# Patient Record
Sex: FEMALE | Race: WHITE | HISPANIC OR LATINO | Employment: UNEMPLOYED | URBAN - NONMETROPOLITAN AREA
[De-identification: names, ages, dates, MRNs, and addresses within clinical notes are randomized per-mention and may not be internally consistent; named-entity substitution may affect disease eponyms.]

---

## 2022-09-11 ENCOUNTER — APPOINTMENT (OUTPATIENT)
Dept: RADIOLOGY | Facility: HOSPITAL | Age: 42
End: 2022-09-11
Payer: COMMERCIAL

## 2022-09-11 ENCOUNTER — HOSPITAL ENCOUNTER (EMERGENCY)
Facility: HOSPITAL | Age: 42
End: 2022-09-14
Attending: EMERGENCY MEDICINE
Payer: COMMERCIAL

## 2022-09-11 DIAGNOSIS — R07.9 CHEST PAIN: ICD-10-CM

## 2022-09-11 DIAGNOSIS — R45.851 SUICIDAL IDEATION: Primary | ICD-10-CM

## 2022-09-11 DIAGNOSIS — F41.9 ANXIETY: ICD-10-CM

## 2022-09-11 LAB
ALBUMIN SERPL BCP-MCNC: 3.4 G/DL (ref 3.5–5)
ALP SERPL-CCNC: 75 U/L (ref 46–116)
ALT SERPL W P-5'-P-CCNC: 19 U/L (ref 12–78)
AMPHETAMINES SERPL QL SCN: NEGATIVE
ANION GAP SERPL CALCULATED.3IONS-SCNC: 8 MMOL/L (ref 4–13)
APAP SERPL-MCNC: <2 UG/ML (ref 10–20)
AST SERPL W P-5'-P-CCNC: 26 U/L (ref 5–45)
BARBITURATES UR QL: NEGATIVE
BASOPHILS # BLD AUTO: 0.04 THOUSANDS/ΜL (ref 0–0.1)
BASOPHILS NFR BLD AUTO: 0 % (ref 0–1)
BENZODIAZ UR QL: NEGATIVE
BILIRUB SERPL-MCNC: 0.75 MG/DL (ref 0.2–1)
BUN SERPL-MCNC: 18 MG/DL (ref 5–25)
CALCIUM ALBUM COR SERPL-MCNC: 9.1 MG/DL (ref 8.3–10.1)
CALCIUM SERPL-MCNC: 8.6 MG/DL (ref 8.3–10.1)
CARDIAC TROPONIN I PNL SERPL HS: 3 NG/L
CHLORIDE SERPL-SCNC: 103 MMOL/L (ref 96–108)
CO2 SERPL-SCNC: 26 MMOL/L (ref 21–32)
COCAINE UR QL: NEGATIVE
CREAT SERPL-MCNC: 0.86 MG/DL (ref 0.6–1.3)
EOSINOPHIL # BLD AUTO: 0.34 THOUSAND/ΜL (ref 0–0.61)
EOSINOPHIL NFR BLD AUTO: 3 % (ref 0–6)
ERYTHROCYTE [DISTWIDTH] IN BLOOD BY AUTOMATED COUNT: 14.3 % (ref 11.6–15.1)
ETHANOL SERPL-MCNC: <3 MG/DL (ref 0–3)
EXT PREG TEST URINE: NEGATIVE
EXT. CONTROL ED NAV: NORMAL
GFR SERPL CREATININE-BSD FRML MDRD: 85 ML/MIN/1.73SQ M
GLUCOSE SERPL-MCNC: 93 MG/DL (ref 65–140)
HCT VFR BLD AUTO: 42.3 % (ref 34.8–46.1)
HGB BLD-MCNC: 13.5 G/DL (ref 11.5–15.4)
IMM GRANULOCYTES # BLD AUTO: 0.06 THOUSAND/UL (ref 0–0.2)
IMM GRANULOCYTES NFR BLD AUTO: 1 % (ref 0–2)
INR PPP: 0.9 (ref 0.84–1.19)
LYMPHOCYTES # BLD AUTO: 3.9 THOUSANDS/ΜL (ref 0.6–4.47)
LYMPHOCYTES NFR BLD AUTO: 30 % (ref 14–44)
MCH RBC QN AUTO: 28.4 PG (ref 26.8–34.3)
MCHC RBC AUTO-ENTMCNC: 31.9 G/DL (ref 31.4–37.4)
MCV RBC AUTO: 89 FL (ref 82–98)
METHADONE UR QL: NEGATIVE
MONOCYTES # BLD AUTO: 1.01 THOUSAND/ΜL (ref 0.17–1.22)
MONOCYTES NFR BLD AUTO: 8 % (ref 4–12)
NEUTROPHILS # BLD AUTO: 7.61 THOUSANDS/ΜL (ref 1.85–7.62)
NEUTS SEG NFR BLD AUTO: 58 % (ref 43–75)
NRBC BLD AUTO-RTO: 0 /100 WBCS
OPIATES UR QL SCN: NEGATIVE
OXYCODONE+OXYMORPHONE UR QL SCN: NEGATIVE
PCP UR QL: NEGATIVE
PLATELET # BLD AUTO: 322 THOUSANDS/UL (ref 149–390)
PMV BLD AUTO: 8.3 FL (ref 8.9–12.7)
POTASSIUM SERPL-SCNC: 4.1 MMOL/L (ref 3.5–5.3)
PROT SERPL-MCNC: 7 G/DL (ref 6.4–8.4)
PROTHROMBIN TIME: 12.3 SECONDS (ref 11.6–14.5)
RBC # BLD AUTO: 4.76 MILLION/UL (ref 3.81–5.12)
SALICYLATES SERPL-MCNC: 3 MG/DL (ref 3–20)
SODIUM SERPL-SCNC: 137 MMOL/L (ref 135–147)
THC UR QL: POSITIVE
WBC # BLD AUTO: 12.96 THOUSAND/UL (ref 4.31–10.16)

## 2022-09-11 PROCEDURE — 80053 COMPREHEN METABOLIC PANEL: CPT | Performed by: EMERGENCY MEDICINE

## 2022-09-11 PROCEDURE — 82077 ASSAY SPEC XCP UR&BREATH IA: CPT | Performed by: EMERGENCY MEDICINE

## 2022-09-11 PROCEDURE — 99285 EMERGENCY DEPT VISIT HI MDM: CPT | Performed by: EMERGENCY MEDICINE

## 2022-09-11 PROCEDURE — 71045 X-RAY EXAM CHEST 1 VIEW: CPT

## 2022-09-11 PROCEDURE — 99285 EMERGENCY DEPT VISIT HI MDM: CPT

## 2022-09-11 PROCEDURE — 84484 ASSAY OF TROPONIN QUANT: CPT | Performed by: EMERGENCY MEDICINE

## 2022-09-11 PROCEDURE — 85610 PROTHROMBIN TIME: CPT | Performed by: EMERGENCY MEDICINE

## 2022-09-11 PROCEDURE — 80143 DRUG ASSAY ACETAMINOPHEN: CPT | Performed by: EMERGENCY MEDICINE

## 2022-09-11 PROCEDURE — 81025 URINE PREGNANCY TEST: CPT | Performed by: EMERGENCY MEDICINE

## 2022-09-11 PROCEDURE — 85025 COMPLETE CBC W/AUTO DIFF WBC: CPT | Performed by: EMERGENCY MEDICINE

## 2022-09-11 PROCEDURE — 99242 OFF/OP CONSLTJ NEW/EST SF 20: CPT | Performed by: GENERAL PRACTICE

## 2022-09-11 PROCEDURE — 80179 DRUG ASSAY SALICYLATE: CPT | Performed by: EMERGENCY MEDICINE

## 2022-09-11 PROCEDURE — 36415 COLL VENOUS BLD VENIPUNCTURE: CPT | Performed by: EMERGENCY MEDICINE

## 2022-09-11 PROCEDURE — 80307 DRUG TEST PRSMV CHEM ANLYZR: CPT | Performed by: EMERGENCY MEDICINE

## 2022-09-11 RX ORDER — LORAZEPAM 1 MG/1
1 TABLET ORAL ONCE
Status: COMPLETED | OUTPATIENT
Start: 2022-09-11 | End: 2022-09-11

## 2022-09-11 RX ORDER — ARIPIPRAZOLE 2 MG/1
2 TABLET ORAL DAILY
Status: DISCONTINUED | OUTPATIENT
Start: 2022-09-11 | End: 2022-09-14 | Stop reason: HOSPADM

## 2022-09-11 RX ORDER — CLONIDINE HYDROCHLORIDE 0.1 MG/1
0.1 TABLET ORAL
Status: DISCONTINUED | OUTPATIENT
Start: 2022-09-11 | End: 2022-09-14 | Stop reason: HOSPADM

## 2022-09-11 RX ORDER — ACETAMINOPHEN 325 MG/1
650 TABLET ORAL ONCE
Status: COMPLETED | OUTPATIENT
Start: 2022-09-11 | End: 2022-09-11

## 2022-09-11 RX ADMIN — LORAZEPAM 1 MG: 1 TABLET ORAL at 16:41

## 2022-09-11 RX ADMIN — ARIPIPRAZOLE 2 MG: 2 TABLET ORAL at 21:36

## 2022-09-11 RX ADMIN — CLONIDINE HYDROCHLORIDE 0.1 MG: 0.1 TABLET ORAL at 21:36

## 2022-09-11 RX ADMIN — ACETAMINOPHEN 650 MG: 325 TABLET ORAL at 15:33

## 2022-09-11 NOTE — ED NOTES
Patient changed into paper scrubs, room cleared for [facility] patient   Belongs checked and placed in locker [address] [place] [address], [address] [place] [address]  09/11/22 Yandel Sotelo RN  09/11/22 3381 [facility] Fleiz Chapin RN  09/11/22 5282
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
## 2022-09-12 PROCEDURE — 96372 THER/PROPH/DIAG INJ SC/IM: CPT

## 2022-09-12 RX ORDER — LORAZEPAM 2 MG/ML
1 INJECTION INTRAMUSCULAR ONCE
Status: COMPLETED | OUTPATIENT
Start: 2022-09-12 | End: 2022-09-12

## 2022-09-12 RX ORDER — LORAZEPAM 1 MG/1
1 TABLET ORAL EVERY 4 HOURS PRN
Status: DISCONTINUED | OUTPATIENT
Start: 2022-09-12 | End: 2022-09-14 | Stop reason: HOSPADM

## 2022-09-12 RX ADMIN — ARIPIPRAZOLE 2 MG: 2 TABLET ORAL at 09:38

## 2022-09-12 RX ADMIN — CLONIDINE HYDROCHLORIDE 0.1 MG: 0.1 TABLET ORAL at 21:02

## 2022-09-12 RX ADMIN — LORAZEPAM 1 MG: 2 INJECTION INTRAMUSCULAR; INTRAVENOUS at 13:20

## 2022-09-12 RX ADMIN — LORAZEPAM 1 MG: 1 TABLET ORAL at 20:43

## 2022-09-12 NOTE — ED NOTES
Patient provided with toothbrush and toothpaste, patient offers no complaints   1:1 observation still in progress     Erik Estevez RN  09/12/22 1015
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
## 2022-09-12 NOTE — ED NOTES
Pt asked that her boyfriend Darrell Nose not be let back in for the night  Pt was not comfortable with him in the room anymore  Boyfriend was told by registration and charge nurse was made aware       Mackenzie Flores RN  09/11/22 3509

## 2022-09-12 NOTE — ED NOTES
Bed search ongoing    [facility] - no beds per Ruthie Craven - no beds per Hernán Lyle - no beds per [place] [address] - no beds per [facility] - no beds per Zeke Moreno - no beds per [facility] - no beds per [facility] - left a message  [facility] - no beds per Mika Peres  [facility] - no beds per [facility] - no beds per [facility] - no beds per [facility]  - no beds per Ariana Coley

## 2022-09-12 NOTE — ED NOTES
Patients "georgie" Chang Gosling asking to come back, RN told font desk to tell him we are not allowing visitors at this time       Sandi Costello RN  09/12/22 6005

## 2022-09-12 NOTE — ED NOTES
[facility] and made aware of 36, Lawton Goldmann with Crisis stated he would be in within the next hour to complete 302 paperwork       Peggy Johns RN  09/12/22 4690

## 2022-09-12 NOTE — ED NOTES
It was brought to the nurses attention by Shaun Nunez from crisis that patient stated to him during the evaluation that she has been self cutting while in the room  Assessed the Patient and pt had no objects on her, however she was using her hospital ID band and found an ECG sticker that was missed on her and was using it to make superficial cuts into left wrist  ID band was removed and ECG sticker was thrown away  Small superficial cuts were noted on her left anterior wrist  MD was made aware  1:1 was re educated on proper visualization of the patient and their activity while in the room       Urszula Barrett RN  09/11/22 0086

## 2022-09-12 NOTE — ED CARE HANDOFF
Emergency Department Sign Out Note        Sign out and transfer of care from Curtis Sánchez  See Separate Emergency Department note  The patient, Linsey Morris, was evaluated by the previous provider for psychiatric evaluation  Workup Completed:  Patient was seen and evaluated by Psychiatry, recommend 201 and if unwilling 302  Patient has been medically cleared    ED Course / Workup Pending (followup): At approximately 12:30 p m  Today, patient became agitated requesting to leave the hospital   I explained to her that she requires inpatient psychiatric treatment and that if she refuses a [id number] petition will be initiated  The patient then eloped out of the emergency room and the police were called to bring the patient back  ED Course as of 09/12/22 1624   Mon Sep 12, 2022   1548 Signed out to Dr Sujatha Coates at [address] [place] [address],[address] pending inpatient psychiatric bed availability     [phone]   Pt now 302 2dary to earlier elopement     Procedures  MDM        Disposition  Final diagnoses:   Suicidal ideation   Anxiety   Chest pain     Time reflects when diagnosis was documented in both MDM as applicable and the Disposition within this note     Time User Action Codes Description Comment    9/11/2022  8:12 PM Miah Neri [facility] Suicidal ideation     9/11/2022  8:12 PM Salbador Rodriguez Add [F41 9] Anxiety     9/11/2022  8:12 PM Salbador Rodriguez Add [R07 9] Chest pain       ED Disposition     ED Disposition   Transfer to [address] [place] [address]   --    Date/Time   Sun Sep 11, 2022  8:32 PM    Comment              MD Elena Benítez Most Recent Value   Sending MD Dr Lex Jeffries    None       Patient's Medications    No medications on file     No discharge procedures on file         ED Provider  Electronically Signed by     Usman Jacome MD  09/12/22 2322

## 2022-09-12 NOTE — ED NOTES
Assumed care of patient  Patient sleeping with no signs of respiratory distress noted, 1:1 continued for patient        Dionne Kim RN  09/12/22 4424

## 2022-09-12 NOTE — ED NOTES
302 signed and Lawton Goldmann with [address] read patient rights        Peggy Johns, PATRICA  09/12/22 3537

## 2022-09-13 LAB
FLUAV RNA RESP QL NAA+PROBE: NEGATIVE
FLUBV RNA RESP QL NAA+PROBE: NEGATIVE
RSV RNA RESP QL NAA+PROBE: NEGATIVE
SARS-COV-2 RNA RESP QL NAA+PROBE: NEGATIVE

## 2022-09-13 PROCEDURE — 0241U HB NFCT DS VIR RESP RNA 4 TRGT: CPT | Performed by: EMERGENCY MEDICINE

## 2022-09-13 RX ORDER — ACETAMINOPHEN 325 MG/1
650 TABLET ORAL EVERY 4 HOURS PRN
Status: CANCELLED | OUTPATIENT
Start: 2022-09-13

## 2022-09-13 RX ORDER — HYDROXYZINE HYDROCHLORIDE 25 MG/1
25 TABLET, FILM COATED ORAL
Status: CANCELLED | OUTPATIENT
Start: 2022-09-13

## 2022-09-13 RX ORDER — LORAZEPAM 2 MG/ML
2 INJECTION INTRAMUSCULAR EVERY 6 HOURS PRN
Status: CANCELLED | OUTPATIENT
Start: 2022-09-13

## 2022-09-13 RX ORDER — BENZTROPINE MESYLATE 1 MG/1
1 TABLET ORAL
Status: CANCELLED | OUTPATIENT
Start: 2022-09-13

## 2022-09-13 RX ORDER — OLANZAPINE 10 MG/1
10 INJECTION, POWDER, LYOPHILIZED, FOR SOLUTION INTRAMUSCULAR
Status: CANCELLED | OUTPATIENT
Start: 2022-09-13

## 2022-09-13 RX ORDER — ACETAMINOPHEN 325 MG/1
650 TABLET ORAL EVERY 6 HOURS PRN
Status: CANCELLED | OUTPATIENT
Start: 2022-09-13

## 2022-09-13 RX ORDER — MAGNESIUM HYDROXIDE/ALUMINUM HYDROXICE/SIMETHICONE 120; 1200; 1200 MG/30ML; MG/30ML; MG/30ML
30 SUSPENSION ORAL EVERY 4 HOURS PRN
Status: CANCELLED | OUTPATIENT
Start: 2022-09-13

## 2022-09-13 RX ORDER — CLONIDINE HYDROCHLORIDE 0.1 MG/1
0.1 TABLET ORAL
Status: CANCELLED | OUTPATIENT
Start: 2022-09-13

## 2022-09-13 RX ORDER — ARIPIPRAZOLE 2 MG/1
2 TABLET ORAL DAILY
Status: CANCELLED | OUTPATIENT
Start: 2022-09-14

## 2022-09-13 RX ORDER — DIPHENHYDRAMINE HYDROCHLORIDE 50 MG/ML
50 INJECTION INTRAMUSCULAR; INTRAVENOUS EVERY 6 HOURS PRN
Status: CANCELLED | OUTPATIENT
Start: 2022-09-13

## 2022-09-13 RX ORDER — AMOXICILLIN 250 MG
1 CAPSULE ORAL DAILY PRN
Status: CANCELLED | OUTPATIENT
Start: 2022-09-13

## 2022-09-13 RX ORDER — HYDROXYZINE HYDROCHLORIDE 25 MG/1
100 TABLET, FILM COATED ORAL
Status: CANCELLED | OUTPATIENT
Start: 2022-09-13

## 2022-09-13 RX ORDER — HYDROXYZINE HYDROCHLORIDE 25 MG/1
50 TABLET, FILM COATED ORAL
Status: CANCELLED | OUTPATIENT
Start: 2022-09-13

## 2022-09-13 RX ORDER — OLANZAPINE 2.5 MG/1
5 TABLET ORAL
Status: CANCELLED | OUTPATIENT
Start: 2022-09-13

## 2022-09-13 RX ORDER — BENZTROPINE MESYLATE 1 MG/ML
1 INJECTION INTRAMUSCULAR; INTRAVENOUS
Status: CANCELLED | OUTPATIENT
Start: 2022-09-13

## 2022-09-13 RX ORDER — ACETAMINOPHEN 325 MG/1
650 TABLET ORAL ONCE
Status: COMPLETED | OUTPATIENT
Start: 2022-09-13 | End: 2022-09-13

## 2022-09-13 RX ORDER — OLANZAPINE 10 MG/1
5 INJECTION, POWDER, LYOPHILIZED, FOR SOLUTION INTRAMUSCULAR
Status: CANCELLED | OUTPATIENT
Start: 2022-09-13

## 2022-09-13 RX ORDER — TRAZODONE HYDROCHLORIDE 50 MG/1
50 TABLET ORAL
Status: CANCELLED | OUTPATIENT
Start: 2022-09-13

## 2022-09-13 RX ORDER — ACETAMINOPHEN 325 MG/1
975 TABLET ORAL EVERY 6 HOURS PRN
Status: CANCELLED | OUTPATIENT
Start: 2022-09-13

## 2022-09-13 RX ORDER — OLANZAPINE 10 MG/1
10 TABLET ORAL
Status: CANCELLED | OUTPATIENT
Start: 2022-09-13

## 2022-09-13 RX ORDER — LORAZEPAM 0.5 MG/1
0.5 TABLET ORAL EVERY 6 HOURS PRN
Status: CANCELLED | OUTPATIENT
Start: 2022-09-13

## 2022-09-13 RX ORDER — OLANZAPINE 2.5 MG/1
2.5 TABLET ORAL
Status: CANCELLED | OUTPATIENT
Start: 2022-09-13

## 2022-09-13 RX ORDER — POLYETHYLENE GLYCOL 3350 17 G/17G
17 POWDER, FOR SOLUTION ORAL DAILY PRN
Status: CANCELLED | OUTPATIENT
Start: 2022-09-13

## 2022-09-13 RX ADMIN — ACETAMINOPHEN 650 MG: 325 TABLET ORAL at 20:18

## 2022-09-13 RX ADMIN — LORAZEPAM 1 MG: 1 TABLET ORAL at 09:52

## 2022-09-13 RX ADMIN — CLONIDINE HYDROCHLORIDE 0.1 MG: 0.1 TABLET ORAL at 21:59

## 2022-09-13 RX ADMIN — ARIPIPRAZOLE 2 MG: 2 TABLET ORAL at 09:51

## 2022-09-13 RX ADMIN — LORAZEPAM 1 MG: 1 TABLET ORAL at 19:53

## 2022-09-13 RX ADMIN — LORAZEPAM 1 MG: 1 TABLET ORAL at 14:55

## 2022-09-13 NOTE — ED NOTES
Insurance Authorization for admission:   Phone call placed to Chelsea Berry  Phone number: [phone]  Spoke to [facility]      14 days approved  9/13/22- 9/26/22  Level of care: 201 behavioral health addmission  Review on 9/26/22  Authorization # [id number]

## 2022-09-13 NOTE — ED NOTES
Patient is accepted at [facility]   Patient is accepted by Sintia Vilchis      Patient may go to the floor after 2000         Nurse report is to be called to [phone] prior to patient transfer

## 2022-09-13 NOTE — ED NOTES
Assumed care of pt, patient is resting comfortably, 1:1 in place        Valerie Valenzuela RN  09/13/22 0718

## 2022-09-13 NOTE — ED NOTES
Pt reports she feels anxious and is having racing thoughts pt states "I feel like I want to leave", pt requested ativan  Pt provided with Ativan per MAR  Pt reports no medical complaints at this time        Juancarlos , RN  09/13/22 0482

## 2022-09-13 NOTE — ED NOTES
Insurance Authorization for admission:   Logged precert  Phone call placed to [employer] number:  [phone]  EVS (Eligibility Verification System) called - [phone]    Automated system indicates: eligible for MA in [employer] for Transportation:    Not needed for higher level of care

## 2022-09-13 NOTE — ED CARE HANDOFF
Emergency Department Sign Out Note        Sign out and transfer of care from Dr FIFI BROCK at 7:00 a m  Art Laws See Separate Emergency Department note  The patient, Kathrin Norman, was evaluated by the previous provider for psychiatric evaluation  Workup Completed:  Medically cleared    ED Course / Workup Pending (followup): Remains stable, awaiting inpatient bed assignment                                  ED Course as of 09/13/22 1554   Mon Sep 12, 2022   1546 Signed out to Dr Jama Powell at [address] [facility],[address] Floor pending inpatient psychiatric bed availability     [phone]   Pt now 302 2dary to earlier elopement   Tue Sep 13, 2022   1554 Signed out to Dr Andrea Spangler pending inpatient bed availability     Procedures  MDM        Disposition  Final diagnoses:   Suicidal ideation   Anxiety   Chest pain     Time reflects when diagnosis was documented in both MDM as applicable and the Disposition within this note     Time User Action Codes Description Comment    9/11/2022  8:12 PM Halle Miah Alvarez Elsa Suicidal ideation     9/11/2022  8:12 PM Les Rota Add [F41 9] Anxiety     9/11/2022  8:12 PM Les Rota Add [R07 9] Chest pain       ED Disposition     ED Disposition   Transfer to [address] [place] [address]   --    Date/Time   Sun Sep 11, 2022  8:32 PM    Comment              MD Documentation    Thomspike Loges   Sending MD Dr Fer Rivero    None       Patient's Medications    No medications on file     No discharge procedures on file         ED Provider  Electronically Signed by     Tamera Medley MD  09/13/22 0782

## 2022-09-14 ENCOUNTER — HOSPITAL ENCOUNTER (INPATIENT)
Facility: HOSPITAL | Age: 42
LOS: 13 days | Discharge: HOME | DRG: 753 | End: 2022-09-27
Attending: HOSPITALIST | Admitting: PSYCHIATRY & NEUROLOGY
Payer: COMMERCIAL

## 2022-09-14 VITALS
HEIGHT: 65 IN | TEMPERATURE: 98.8 F | WEIGHT: 185.41 LBS | BODY MASS INDEX: 30.89 KG/M2 | SYSTOLIC BLOOD PRESSURE: 111 MMHG | OXYGEN SATURATION: 98 % | HEART RATE: 75 BPM | DIASTOLIC BLOOD PRESSURE: 78 MMHG | RESPIRATION RATE: 16 BRPM

## 2022-09-14 DIAGNOSIS — R07.9 CHEST PAIN: ICD-10-CM

## 2022-09-14 DIAGNOSIS — J45.909 ASTHMA: ICD-10-CM

## 2022-09-14 DIAGNOSIS — R45.1 RESTLESSNESS: ICD-10-CM

## 2022-09-14 DIAGNOSIS — E55.9 VITAMIN D DEFICIENCY: ICD-10-CM

## 2022-09-14 DIAGNOSIS — F41.9 ANXIETY: ICD-10-CM

## 2022-09-14 DIAGNOSIS — K21.9 GERD (GASTROESOPHAGEAL REFLUX DISEASE): ICD-10-CM

## 2022-09-14 DIAGNOSIS — I10 HYPERTENSION: ICD-10-CM

## 2022-09-14 DIAGNOSIS — G43.909 MIGRAINE: Primary | ICD-10-CM

## 2022-09-14 DIAGNOSIS — F31.9 BIPOLAR 1 DISORDER (HCC): ICD-10-CM

## 2022-09-14 PROCEDURE — 93005 ELECTROCARDIOGRAM TRACING: CPT

## 2022-09-14 RX ORDER — OLANZAPINE 10 MG/1
INJECTION, POWDER, LYOPHILIZED, FOR SOLUTION INTRAMUSCULAR
Status: COMPLETED
Start: 2022-09-14 | End: 2022-09-14

## 2022-09-14 RX ORDER — OLANZAPINE 5 MG/1
5 TABLET ORAL
Status: DISCONTINUED | OUTPATIENT
Start: 2022-09-14 | End: 2022-09-27 | Stop reason: HOSPADM

## 2022-09-14 RX ORDER — HYDROXYZINE HYDROCHLORIDE 25 MG/1
25 TABLET, FILM COATED ORAL
Status: DISCONTINUED | OUTPATIENT
Start: 2022-09-14 | End: 2022-09-27 | Stop reason: HOSPADM

## 2022-09-14 RX ORDER — OLANZAPINE 10 MG/1
10 INJECTION, POWDER, LYOPHILIZED, FOR SOLUTION INTRAMUSCULAR
Status: DISCONTINUED | OUTPATIENT
Start: 2022-09-14 | End: 2022-09-27 | Stop reason: HOSPADM

## 2022-09-14 RX ORDER — OLANZAPINE 10 MG/1
10 TABLET ORAL
Status: DISCONTINUED | OUTPATIENT
Start: 2022-09-14 | End: 2022-09-27 | Stop reason: HOSPADM

## 2022-09-14 RX ORDER — HYDROXYZINE 50 MG/1
50 TABLET, FILM COATED ORAL
Status: DISCONTINUED | OUTPATIENT
Start: 2022-09-14 | End: 2022-09-27 | Stop reason: HOSPADM

## 2022-09-14 RX ORDER — LORAZEPAM 2 MG/ML
2 INJECTION INTRAMUSCULAR EVERY 6 HOURS PRN
Status: DISCONTINUED | OUTPATIENT
Start: 2022-09-14 | End: 2022-09-27 | Stop reason: HOSPADM

## 2022-09-14 RX ORDER — OLANZAPINE 10 MG/1
5 INJECTION, POWDER, LYOPHILIZED, FOR SOLUTION INTRAMUSCULAR
Status: DISCONTINUED | OUTPATIENT
Start: 2022-09-14 | End: 2022-09-27 | Stop reason: HOSPADM

## 2022-09-14 RX ORDER — ACETAMINOPHEN 325 MG/1
650 TABLET ORAL EVERY 4 HOURS PRN
Status: DISCONTINUED | OUTPATIENT
Start: 2022-09-14 | End: 2022-09-27 | Stop reason: HOSPADM

## 2022-09-14 RX ORDER — TRAZODONE HYDROCHLORIDE 50 MG/1
50 TABLET ORAL
Status: DISCONTINUED | OUTPATIENT
Start: 2022-09-14 | End: 2022-09-27 | Stop reason: HOSPADM

## 2022-09-14 RX ORDER — DIPHENHYDRAMINE HYDROCHLORIDE 50 MG/ML
50 INJECTION INTRAMUSCULAR; INTRAVENOUS EVERY 6 HOURS PRN
Status: DISCONTINUED | OUTPATIENT
Start: 2022-09-14 | End: 2022-09-27 | Stop reason: HOSPADM

## 2022-09-14 RX ORDER — AMOXICILLIN 250 MG
1 CAPSULE ORAL DAILY PRN
Status: DISCONTINUED | OUTPATIENT
Start: 2022-09-14 | End: 2022-09-27 | Stop reason: HOSPADM

## 2022-09-14 RX ORDER — OLANZAPINE 2.5 MG/1
2.5 TABLET ORAL
Status: DISCONTINUED | OUTPATIENT
Start: 2022-09-14 | End: 2022-09-27 | Stop reason: HOSPADM

## 2022-09-14 RX ORDER — ACETAMINOPHEN 325 MG/1
650 TABLET ORAL EVERY 6 HOURS PRN
Status: DISCONTINUED | OUTPATIENT
Start: 2022-09-14 | End: 2022-09-27 | Stop reason: HOSPADM

## 2022-09-14 RX ORDER — BENZTROPINE MESYLATE 1 MG/1
1 TABLET ORAL
Status: DISCONTINUED | OUTPATIENT
Start: 2022-09-14 | End: 2022-09-27 | Stop reason: HOSPADM

## 2022-09-14 RX ORDER — CLONIDINE HYDROCHLORIDE 0.1 MG/1
0.1 TABLET ORAL
Status: DISCONTINUED | OUTPATIENT
Start: 2022-09-14 | End: 2022-09-22

## 2022-09-14 RX ORDER — POLYETHYLENE GLYCOL 3350 17 G/17G
17 POWDER, FOR SOLUTION ORAL DAILY PRN
Status: DISCONTINUED | OUTPATIENT
Start: 2022-09-14 | End: 2022-09-27 | Stop reason: HOSPADM

## 2022-09-14 RX ORDER — HYDROXYZINE 50 MG/1
100 TABLET, FILM COATED ORAL
Status: DISCONTINUED | OUTPATIENT
Start: 2022-09-14 | End: 2022-09-27 | Stop reason: HOSPADM

## 2022-09-14 RX ORDER — BENZTROPINE MESYLATE 1 MG/ML
1 INJECTION INTRAMUSCULAR; INTRAVENOUS
Status: DISCONTINUED | OUTPATIENT
Start: 2022-09-14 | End: 2022-09-27 | Stop reason: HOSPADM

## 2022-09-14 RX ORDER — LORAZEPAM 0.5 MG/1
0.5 TABLET ORAL EVERY 6 HOURS PRN
Status: DISCONTINUED | OUTPATIENT
Start: 2022-09-14 | End: 2022-09-27 | Stop reason: HOSPADM

## 2022-09-14 RX ORDER — MAGNESIUM HYDROXIDE/ALUMINUM HYDROXICE/SIMETHICONE 120; 1200; 1200 MG/30ML; MG/30ML; MG/30ML
30 SUSPENSION ORAL EVERY 4 HOURS PRN
Status: DISCONTINUED | OUTPATIENT
Start: 2022-09-14 | End: 2022-09-27 | Stop reason: HOSPADM

## 2022-09-14 RX ORDER — ACETAMINOPHEN 325 MG/1
975 TABLET ORAL EVERY 6 HOURS PRN
Status: DISCONTINUED | OUTPATIENT
Start: 2022-09-14 | End: 2022-09-27 | Stop reason: HOSPADM

## 2022-09-14 RX ORDER — ARIPIPRAZOLE 2 MG/1
2 TABLET ORAL DAILY
Status: DISCONTINUED | OUTPATIENT
Start: 2022-09-15 | End: 2022-09-15

## 2022-09-14 RX ADMIN — OLANZAPINE 10 MG: 10 INJECTION, POWDER, LYOPHILIZED, FOR SOLUTION INTRAMUSCULAR at 12:58

## 2022-09-14 RX ADMIN — LORAZEPAM 1 MG: 1 TABLET ORAL at 07:07

## 2022-09-14 RX ADMIN — LORAZEPAM 0.5 MG: 0.5 TABLET ORAL at 16:18

## 2022-09-14 RX ADMIN — DIPHENHYDRAMINE HYDROCHLORIDE 50 MG: 50 INJECTION, SOLUTION INTRAMUSCULAR; INTRAVENOUS at 19:26

## 2022-09-14 RX ADMIN — ARIPIPRAZOLE 2 MG: 2 TABLET ORAL at 09:26

## 2022-09-14 RX ADMIN — DIPHENHYDRAMINE HYDROCHLORIDE 50 MG: 50 INJECTION, SOLUTION INTRAMUSCULAR; INTRAVENOUS at 12:58

## 2022-09-14 RX ADMIN — OLANZAPINE 10 MG: 10 INJECTION, POWDER, FOR SOLUTION INTRAMUSCULAR at 12:58

## 2022-09-14 RX ADMIN — CLONIDINE HYDROCHLORIDE 0.1 MG: 0.1 TABLET ORAL at 21:19

## 2022-09-14 RX ADMIN — OLANZAPINE 5 MG: 10 INJECTION, POWDER, FOR SOLUTION INTRAMUSCULAR at 19:26

## 2022-09-14 NOTE — NURSING NOTE
Pt admitted unit from ED  Pt stated she is here due to constant SI w plan to cut self, OD on meds and stated she would use any other method  Pt was tearful on admission and stated she was currently having SI and stated the voices she was hearing were telling her to hurt herself  Pt stated she was scared of them and didn't want to feel this way and states the voices have been getting worse of the years  Pt denied any depression states she wants to die to stop the voices, states the voices also tell her to hurt other people  Offered patient Room 243 for some privacy and gave patient IM zyprexa 10mg and IM benadryl 50mg at 1258  After patient rested for awhile she wanted to rejoin unit stated she felt better and was no longer tearful and having SI  Patient states the meds helped everything feel "quiet" and we were able to finish her admission paperwork  Patient skin check done with Lin Kelly  Small superficial cuts noted on left wrist otherwise skin clear  Pt took shower  Pt requested something for anxiety so at 1618 gave patient 0 5mg of ativan and rechecked at 1715 and med was effective  Pt states she is hopeful and excited to be receiving help   Q 7 min behavioral and safety checks in place

## 2022-09-14 NOTE — ED CARE HANDOFF
Emergency Department Sign Out Note        Sign out and transfer of care from [facility]                                    ED Course as of 09/14/22 0718   Wed Sep 14, 2022   0617 EMTALA forms completed   0718 Stable overnight  Endorsed to Namrata Walker     Procedures  MDM        Disposition  Final diagnoses:   Suicidal ideation   Anxiety   Chest pain     Time reflects when diagnosis was documented in both MDM as applicable and the Disposition within this note     Time User Action Codes Description Comment    9/11/2022  8:12 PM Halle [address] [place] Elsa Suicidal ideation     9/11/2022  8:12 PM Virl Newcomb [F41 9] Anxiety     9/11/2022  8:12 PM Willian Aguirre Add [R07 9] Chest pain       ED Disposition     ED Disposition   Transfer to [address] [place] [address]   --    Date/Time   Sun Sep 11, 2022  8:32 PM    Comment              MD Documentation    Landon Shaffer Most Recent Value   Patient Condition The patient has been stabilized such that within reasonable medical probability, no material deterioration of the patient condition or the condition of the unborn child(claudy) is likely to result from the transfer   Reason for Transfer Level of Care needed not available at this facility   Benefits of Transfer Specialized equipment and/or services available at the receiving facility (Include comment)________________________   Risks of Transfer Potential for delay in receiving treatment   Accepting Physician Alireza Name, Everardo Cobos [address] [place] [address]   Sending MD [facility]   Provider Certification General risk, such as traffic hazards, adverse weather conditions, rough terrain or turbulence, possible failure of equipment (including vehicle or aircraft), or consequences of actions of persons outside the control of the transport personnel      RN Documentation    Flowsheet Row Most [address] [facility] Name, Everardo Cobos [address] [place] [address]      Follow-up Information    None       Patient's Medications    No medications on file     No discharge procedures on file         ED Provider  Electronically Signed by     Corrina Mccullough DO  09/14/22 1202

## 2022-09-14 NOTE — ED NOTES
Pt discharged with [facility] drivers  Belongings were all sent with the significant other to home   Pt discharged       Raman Parks RN  09/14/22 7562

## 2022-09-14 NOTE — PROGRESS NOTES
09/14/22 1450   Referral Data   Referral Source Other (Comment)   Referral Name KEONAlaMarka   Referral Reason [address] [address] of Residence [place]   Readmission Root Cause   30 Day Readmission No   Patient Information   Mental Status Alert   Primary Caregiver Self   Support System Immediate family   Episcopalian/Cultural Requests none on unit   Legal Information   Tx Plan Signed Yes   Current Status: 201   Activities of Daily Living Prior to Admission   Functional Status Independent   Assistive Device No device needed   Living Arrangement Apartment   Ambulation Independent   Access to Firearms   Access to Firearms No   [address] Unemployed   [place] [facility]   Means of Transport to Hospitals in [place]: Drives Self

## 2022-09-14 NOTE — PLAN OF CARE
Problem: DISCHARGE PLANNING - CARE MANAGEMENT  Goal: Discharge to post-acute care or home with appropriate resources  Description: INTERVENTIONS:  - Conduct assessment to determine patient/family and health care team treatment goals, and need for post-acute services based on payer coverage, community resources, and patient preferences, and barriers to discharge  - Address psychosocial, clinical, and financial barriers to discharge as identified in assessment in conjunction with the patient/family and health care team  - Arrange appropriate level of post-acute services according to patients   needs and preference and payer coverage in collaboration with the physician and health care team  - Communicate with and update the patient/family, physician, and health care team regarding progress on the discharge plan  - Arrange appropriate transportation to post-acute venues  Outcome: Progressing     Pt new 201, pt progressing, no d/c date at this time

## 2022-09-14 NOTE — ED NOTES
Pt taken to shower in [address] [place] [address] area with 1:1 sitter Ashleigh present  Returned to room, resting in bed  Offers no complaints or needs  Remains calm and cooperative        Qamar Grewal, PATRICA  09/13/22 2122

## 2022-09-14 NOTE — ED NOTES
Pt complained of right side chest area discomfort  VS assessed and are WNL  EKG completed showing NSR   Dr Dustin Lockhart notified of the same and no new changes at this time        Javed Muhammad RN  09/14/22 6331
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
## 2022-09-14 NOTE — EMTALA/ACUTE CARE TRANSFER
[address] [place] [address] 51  [facility] [address]  Dept: [phone]      EMTALA TRANSFER CONSENT    NAME Monica Degroot                                         1980                              MRN [id number]    I have been informed of my rights regarding examination, treatment, and transfer   by Dr Justina Luciano DO    Benefits: Specialized equipment and/or services available at the receiving facility (Include comment)________________________    Risks: Potential for delay in receiving treatment      Consent for Transfer:  I acknowledge that my medical condition has been evaluated and explained to me by the emergency department physician or other qualified medical person and/or my attending physician, who has recommended that I be transferred to the service of  Accepting Physician: Melanie Fischer at [facility] Name, [address] : [facility]  The above potential benefits of such transfer, the potential risks associated with such transfer, and the probable risks of not being transferred have been explained to me, and I fully understand them  The doctor has explained that, in my case, the benefits of transfer outweigh the risks  I agree to be transferred  I authorize the performance of emergency medical procedures and treatments upon me in both transit and upon arrival at the receiving facility  Additionally, I authorize the release of any and all medical records to the receiving facility and request they be transported with me, if possible  I understand that the safest mode of transportation during a medical emergency is an ambulance and that the Hospital advocates the use of this mode of transport  Risks of traveling to the receiving facility by car, including absence of medical control, life sustaining equipment, such as oxygen, and medical personnel has been explained to me and I fully understand them      (JUNAID CORRECT BOX BELOW)  [ x ]  I consent to the stated transfer and to be transported by ambulance/helicopter  [  ]  I consent to the stated transfer, but refuse transportation by ambulance and accept full responsibility for my transportation by car  I understand the risks of non-ambulance transfers and I exonerate the Hospital and its staff from any deterioration in my condition that results from this refusal     X___________________________________________    DATE  22  TIME________  Signature of patient or legally responsible individual signing on patient behalf           RELATIONSHIP TO PATIENT_________________________          Provider Certification    NAME Jennie Winston                                         1980                              MRN [id number]    A medical screening exam was performed on the above named patient  Based on the examination:    Condition Necessitating Transfer The primary encounter diagnosis was Suicidal ideation  Diagnoses of Anxiety and Chest pain were also pertinent to this visit  Patient Condition: The patient has been stabilized such that within reasonable medical probability, no material deterioration of the patient condition or the condition of the unborn child(claudy) is likely to result from the transfer    Reason for Transfer: Level of Care needed not available at this facility    Transfer Requirements: Facility [address]   · Space available and qualified personnel available for treatment as acknowledged by    · Agreed to accept transfer and to provide appropriate medical treatment as acknowledged by       Adina  · Appropriate medical records of the examination and treatment of the patient are provided at the time of transfer   [facility], [address] _______  · Transfer will be performed by qualified personnel from    and appropriate transfer equipment as required, including the use of necessary and appropriate life support measures      Provider Certification: I have examined the patient and explained the following risks and benefits of being transferred/refusing transfer to the patient/family:  General risk, such as traffic hazards, adverse weather conditions, rough terrain or turbulence, possible failure of equipment (including vehicle or aircraft), or consequences of actions of persons outside the control of the transport personnel      Based on these reasonable risks and benefits to the patient and/or the unborn child(claudy), and based upon the information available at the time of the patients examination, I certify that the medical benefits reasonably to be expected from the provision of appropriate medical treatments at another medical facility outweigh the increasing risks, if any, to the individuals medical condition, and in the case of labor to the unborn child, from effecting the transfer      X____________________________________________ DATE 09/14/22        TIME_______      ORIGINAL - SEND TO MEDICAL RECORDS   COPY - SEND WITH PATIENT DURING TRANSFER

## 2022-09-14 NOTE — ED NOTES
Crisis contacted [facility] to see if transport was requested  Spoke with Chaparro herrera  Transport was scheduled for 12pm by [facility]  Transportation is arranged with [facility]  Transportation is scheduled for 12pm         Nurse report is to be called to [phone] prior to patient transfer

## 2022-09-14 NOTE — LETTER
September 26, 2022     [address] Gisela Haynes  [address] [facility] [address]    Patient: Jeffy Turk   YOB: 1980   Date of Visit: 9/13/2022     To [facility] Outpatient Provider:    Patient is receiving her maintenance IM Invega Sustenna 156 mg injections every 4 weeks at the [facility] beginning on 10/24/2022  She is scheduled for the next 3 doses with her last dose on 12/18  Please continue and manage her following Invega Sustenna injections after 12/18   Thanks!!    - Raquel Osorio DO

## 2022-09-14 NOTE — PROGRESS NOTES
Discussed with patient: AUDIT score of 0   UDS/Identified Substance(s) used: THC  Risks discussed included: MH and physical health risks  Recommendations discussed: IP & OP D&A tx recs  Patient's response: pt declined tx recs due to medical marijuana card

## 2022-09-14 NOTE — PLAN OF CARE
Problem: Ineffective Coping  Goal: Cooperates with admission process  Description: Interventions:   - Complete admission process  Outcome: Completed

## 2022-09-15 PROBLEM — F31.9 BIPOLAR 1 DISORDER (HCC): Status: ACTIVE | Noted: 2022-09-15

## 2022-09-15 LAB
25(OH)D3 SERPL-MCNC: 27.3 NG/ML (ref 30–100)
ALBUMIN SERPL BCP-MCNC: 4.1 G/DL (ref 3.5–5)
ALP SERPL-CCNC: 67 U/L (ref 34–104)
ALT SERPL W P-5'-P-CCNC: 8 U/L (ref 7–52)
ANION GAP SERPL CALCULATED.3IONS-SCNC: 10 MMOL/L (ref 4–13)
AST SERPL W P-5'-P-CCNC: 25 U/L (ref 13–39)
BASOPHILS # BLD AUTO: 0.02 THOUSANDS/ΜL (ref 0–0.1)
BASOPHILS NFR BLD AUTO: 0 % (ref 0–1)
BILIRUB SERPL-MCNC: 0.56 MG/DL (ref 0.2–1)
BUN SERPL-MCNC: 13 MG/DL (ref 5–25)
CALCIUM SERPL-MCNC: 9.3 MG/DL (ref 8.4–10.2)
CHLORIDE SERPL-SCNC: 101 MMOL/L (ref 96–108)
CHOLEST SERPL-MCNC: 195 MG/DL
CO2 SERPL-SCNC: 27 MMOL/L (ref 21–32)
CREAT SERPL-MCNC: 0.93 MG/DL (ref 0.6–1.3)
EOSINOPHIL # BLD AUTO: 0.23 THOUSAND/ΜL (ref 0–0.61)
EOSINOPHIL NFR BLD AUTO: 2 % (ref 0–6)
ERYTHROCYTE [DISTWIDTH] IN BLOOD BY AUTOMATED COUNT: 13.7 % (ref 11.6–15.1)
FOLATE SERPL-MCNC: 17.4 NG/ML (ref 3.1–17.5)
GFR SERPL CREATININE-BSD FRML MDRD: 77 ML/MIN/1.73SQ M
GLUCOSE P FAST SERPL-MCNC: 78 MG/DL (ref 65–99)
GLUCOSE SERPL-MCNC: 78 MG/DL (ref 65–140)
HCT VFR BLD AUTO: 44.6 % (ref 34.8–46.1)
HDLC SERPL-MCNC: 41 MG/DL
HGB BLD-MCNC: 14 G/DL (ref 11.5–15.4)
IMM GRANULOCYTES # BLD AUTO: 0.04 THOUSAND/UL (ref 0–0.2)
IMM GRANULOCYTES NFR BLD AUTO: 0 % (ref 0–2)
LDLC SERPL CALC-MCNC: 137 MG/DL (ref 0–100)
LYMPHOCYTES # BLD AUTO: 4.79 THOUSANDS/ΜL (ref 0.6–4.47)
LYMPHOCYTES NFR BLD AUTO: 38 % (ref 14–44)
MCH RBC QN AUTO: 28.7 PG (ref 26.8–34.3)
MCHC RBC AUTO-ENTMCNC: 31.4 G/DL (ref 31.4–37.4)
MCV RBC AUTO: 92 FL (ref 82–98)
MONOCYTES # BLD AUTO: 1.23 THOUSAND/ΜL (ref 0.17–1.22)
MONOCYTES NFR BLD AUTO: 10 % (ref 4–12)
NEUTROPHILS # BLD AUTO: 6.43 THOUSANDS/ΜL (ref 1.85–7.62)
NEUTS SEG NFR BLD AUTO: 50 % (ref 43–75)
NONHDLC SERPL-MCNC: 154 MG/DL
NRBC BLD AUTO-RTO: 0 /100 WBCS
PLATELET # BLD AUTO: 330 THOUSANDS/UL (ref 149–390)
PMV BLD AUTO: 9.1 FL (ref 8.9–12.7)
POTASSIUM SERPL-SCNC: 3.7 MMOL/L (ref 3.5–5.3)
PROT SERPL-MCNC: 7.3 G/DL (ref 6.4–8.4)
RBC # BLD AUTO: 4.87 MILLION/UL (ref 3.81–5.12)
SODIUM SERPL-SCNC: 138 MMOL/L (ref 135–147)
TRIGL SERPL-MCNC: 87 MG/DL
TSH SERPL DL<=0.05 MIU/L-ACNC: 3.02 UIU/ML (ref 0.45–4.5)
VIT B12 SERPL-MCNC: 749 PG/ML (ref 100–900)
WBC # BLD AUTO: 12.74 THOUSAND/UL (ref 4.31–10.16)

## 2022-09-15 PROCEDURE — 84443 ASSAY THYROID STIM HORMONE: CPT

## 2022-09-15 PROCEDURE — 80061 LIPID PANEL: CPT

## 2022-09-15 PROCEDURE — 93005 ELECTROCARDIOGRAM TRACING: CPT

## 2022-09-15 PROCEDURE — 82746 ASSAY OF FOLIC ACID SERUM: CPT

## 2022-09-15 PROCEDURE — 80053 COMPREHEN METABOLIC PANEL: CPT

## 2022-09-15 PROCEDURE — 99223 1ST HOSP IP/OBS HIGH 75: CPT | Performed by: PSYCHIATRY & NEUROLOGY

## 2022-09-15 PROCEDURE — 82306 VITAMIN D 25 HYDROXY: CPT

## 2022-09-15 PROCEDURE — 85025 COMPLETE CBC W/AUTO DIFF WBC: CPT

## 2022-09-15 PROCEDURE — 82607 VITAMIN B-12: CPT

## 2022-09-15 RX ORDER — OLANZAPINE 10 MG/1
10 TABLET, ORALLY DISINTEGRATING ORAL
Status: DISCONTINUED | OUTPATIENT
Start: 2022-09-15 | End: 2022-09-19

## 2022-09-15 RX ORDER — ALBUTEROL SULFATE 90 UG/1
2 AEROSOL, METERED RESPIRATORY (INHALATION) EVERY 4 HOURS PRN
Status: DISCONTINUED | OUTPATIENT
Start: 2022-09-15 | End: 2022-09-27 | Stop reason: HOSPADM

## 2022-09-15 RX ORDER — OLANZAPINE 5 MG/1
5 TABLET, ORALLY DISINTEGRATING ORAL DAILY
Status: DISCONTINUED | OUTPATIENT
Start: 2022-09-16 | End: 2022-09-16

## 2022-09-15 RX ORDER — TOPIRAMATE 25 MG/1
25 TABLET ORAL 2 TIMES DAILY
Status: DISCONTINUED | OUTPATIENT
Start: 2022-09-15 | End: 2022-09-27 | Stop reason: HOSPADM

## 2022-09-15 RX ORDER — MELATONIN
1000 DAILY
Status: DISCONTINUED | OUTPATIENT
Start: 2022-09-16 | End: 2022-09-27 | Stop reason: HOSPADM

## 2022-09-15 RX ADMIN — ARIPIPRAZOLE 2 MG: 2 TABLET ORAL at 08:39

## 2022-09-15 RX ADMIN — CLONIDINE HYDROCHLORIDE 0.1 MG: 0.1 TABLET ORAL at 19:55

## 2022-09-15 RX ADMIN — TOPIRAMATE 25 MG: 25 TABLET, FILM COATED ORAL at 12:54

## 2022-09-15 RX ADMIN — ACETAMINOPHEN 325MG 650 MG: 325 TABLET ORAL at 10:51

## 2022-09-15 RX ADMIN — TOPIRAMATE 25 MG: 25 TABLET, FILM COATED ORAL at 18:32

## 2022-09-15 RX ADMIN — OLANZAPINE 2.5 MG: 2.5 TABLET, FILM COATED ORAL at 08:42

## 2022-09-15 RX ADMIN — HYDROXYZINE HYDROCHLORIDE 100 MG: 50 TABLET, FILM COATED ORAL at 10:14

## 2022-09-15 RX ADMIN — OLANZAPINE 10 MG: 10 TABLET, ORALLY DISINTEGRATING ORAL at 20:45

## 2022-09-15 RX ADMIN — HYDROXYZINE HYDROCHLORIDE 50 MG: 50 TABLET, FILM COATED ORAL at 16:00

## 2022-09-15 RX ADMIN — OLANZAPINE 10 MG: 10 TABLET, FILM COATED ORAL at 12:08

## 2022-09-15 NOTE — PROGRESS NOTES
09/15/22 1045   Activity/Group Checklist   Group Admission/Discharge   Attendance Attended   Attendance Duration (min) 16-30   Interactions Interacted appropriately   Affect/Mood Appropriate  (tearful, anxious)   Goals Achieved Identified feelings; Identified triggers; Identified relapse prevention strategies; Discussed coping strategies; Identified distorted thoughts/beliefs; Able to listen to others; Identified resources and support systems; Able to engage in interactions; Able to reflect/comment on own behavior;Able to recieve feedback; Able to self-disclose   Patient agreeable to complete self assessment and relapse prevention plan with RT  Patient shared that she is here in the hospital due to her AH that tell her to hurt herself and others  She is open about being bipolar and that the [address] have been with her for the last 3 years and have been getting worse  She just wants to get help to feel safe to be around her children and grandbabies  Patient used to do lots of things but as of late she keeps herself in her home and with her plants and pets; at times will watch TV  She just wants to feel lighter and that her AH are controlled  She was tearful and anxious during assessment but kept good eye contact and expressed a want for help and to be better

## 2022-09-15 NOTE — NURSING NOTE
Rosamaria Hagen had good results from last night's medication for hallucinations  She slept through the night without interruption

## 2022-09-15 NOTE — PROGRESS NOTES
09/15/22 0800   Activity/Group Checklist   Group Community meeting  (Patient check in with coffee/ patients eating and drink in room for covid precautions)   Attendance Attended   Attendance Duration (min) 16-30   Interactions Interacted appropriately   Affect/Mood Appropriate   Goals Achieved Identified feelings; Able to listen to others; Able to engage in interactions; Able to self-disclose; Able to recieve feedback

## 2022-09-15 NOTE — H&P
Psychiatric Evaluation - Behavioral Health     Identification Tabby Jaquez 46 y o  female MRN: [id number]  Unit/Bed#: [facility] 080-81 Encounter: [id number]    Chief Complaint: "I wanted to die", "I was hearing voices telling me to hurt myself", "I thought I was losing my mind", "I don't want to go on living like this", depression, anxiety, suicidal ideation, suicidal threats, unstable mood, acute psychosis, psychotic symptoms, auditory hallucinations, paranoid ideation and delusional thoughts    History of Present Illness     Jeffy Turk is a 46 y o  female with a history of Bipolar Disorder, psychosis and anxiety who was admitted to the inpatient psychiatric unit on a involuntary 302 commitment basis due to depression, anxiety, unstable mood, signs of acute psychosis and auditory hallucinations  Symptoms prior to admission included feeling depressed and suicidal ideation  Onset of symptoms was gradual starting several years ago with progressively worsening course since that time  Stressors preceding admission included ongoing anxiety and difficulty with anger management  On initial evaluation after admission to the inpatient psychiatric unit the patient  Tense, but open to discuss her feelings  She stated that she has been hearing voices, up to 3 voices that all negative, in order to diminished effect of voices on the patient mind she at times listen to loud music  At the same time the patient is very sensitive to noises, and even discussion not related to her may over-sensitized the patient and she may "lose control "  The patient felt ashamed that during 1 of this recent loss control episode she with a knife was chasing her boyfriend who she she believe was unfaithful to her because the voices told her so  The patient stated that her paranoia of jealousy was not based on reality because her boyfriend stays in the house most of the time        Patient admitted having severe mood swings,  At times having increase of energy but most of the time mixed or depressed affect  The patient stated that her problem with controlling her anger issue lead to her inability to hold job, she used to work in customer service and at times was not able to respond politely to unrealistic demands of her customers  As a result he lost her job  Patient wants to get better control over her mood and stop hearing voices  Patient has a history of cutting her wrist because of pain, and using physical pain to diminish her emotional pain     She also had history of overdosing of her medications    Psychiatric Review Of Systems:    sleep changes: decreased  appetite changes: decreased  energy/anergy: decreased  anxiety/panic: yes, panic attacks  [place]: past mixed episodes  self injurious behavior/risky behavior: not recently  Suicidal ideation: yes, no plan  Homicidal ideation: no  Auditory hallucinations: yes, auditory hallucinations  Visual hallucinations: no  Delusional thinking: paranoid thoughts      Historical Information     Past Psychiatric History:     Past Inpatient Psychiatric Treatment:   One past inpatient psychiatric admission  Past Outpatient Psychiatric Treatment:    Was in outpatient psychiatric treatment in the past with a psychiatrist  Past Suicide Attempts:    yes, by overdose on medications and cutting self  Past Psychiatric Medication Trials:    Paxil     Substance Abuse History:  Social History     Tobacco History     Smoking Status  Never Smoker    Smokeless Tobacco Use  Never Used          Alcohol History     Alcohol Use Status  Yes          Drug Use     Drug Use Status  Yes Types  Marijuana          Sexual Activity     Sexually Active  Yes          Activities of Daily Living    Not Asked                 I have assessed this patient for substance use within the past 12 months    History of Inpatient/Outpatient rehabilitation program: no  Smoking history: occasionally    Family Psychiatric History:     Psychiatric Illness: Mother - bipolar disorder, extended family members - schizophrenia  Substance Abuse: Mother - substance abuse  Suicide Attempts:  unknown    Social History:    Education: high school diploma/GED  Marital History:   Occupational History: currently unemployed          Traumatic History:     Abuse: no history of verbal abuse, positive history of neglect    Past Medical History:    History of Seizures: no    Past Medical History:   Diagnosis Date    Asthma     Psychiatric disorder      Past Surgical History:   Procedure Laterality Date    TUBAL LIGATION         Medical Review Of Systems:    EFO Review Of Systems: Constitutional: positive for fatigue  Respiratory: negative  Cardiovascular: positive for chest pressure/discomfort  Gastrointestinal: negative  Musculoskeletal:negative  Neurological: negative  Endocrine: negative  Allergic/Immunologic: negative  all other symptoms are negaative    Allergies:    No Known Allergies    Medications: All current active medications have been reviewed      Objective     Vital signs in last 24 hours:    Temp:  [97 4 °F (36 3 °C)-98 °F (36 7 °C)] 98 °F (36 7 °C)  HR:  [86] 86  Resp:  [16-18] 18  BP: (117-124)/(54-87) 119/87    No intake or output data in the 24 hours ending 09/15/22 1210     Mental Status Evaluation:      Appearance:  dressed in hospital attire   Behavior:  normal, cooperative, calm   Mood:  depressed, anxious   Affect: constricted    Speech:  decreased rate, slow   Language: appropriate   Thought Process:  concrete   Associations: concrete associations   Thought Content:  paranoid delusions   Perceptual Disturbances: auditory hallucinations   Risk Potential: Suicidal ideation - Yes, without plan  Homicidal ideation - None  Potential for aggression - No   Sensorium:  oriented to person, place and time   Memory:  recent and remote memory grossly intact   Consciousness:  alert and awake   Attention: attention span and concentration are normal Fund of Knowledge: awareness of current events appropriate   Insight:  impaired   Judgment: limited   Muscle Tone: normal   Gait/Station: normal gait/station and normal balance   Motor Activity: no abnormal movements               Laboratory Results:   I have personally reviewed all pertinent laboratory/tests results  Most Recent Labs:   Lab Results   Component Value Date    WBC 12 74 (H) 09/15/2022    RBC 4 87 09/15/2022    HGB 14 0 09/15/2022    HCT 44 6 09/15/2022     09/15/2022    RDW 13 7 09/15/2022    NEUTROABS 6 43 09/15/2022    SODIUM 138 09/15/2022    K 3 7 09/15/2022     09/15/2022    CO2 27 09/15/2022    BUN 13 09/15/2022    CREATININE 0 93 09/15/2022    GLUC 78 09/15/2022    GLUF 78 09/15/2022    CALCIUM 9 3 09/15/2022    AST 25 09/15/2022    ALT 8 09/15/2022    ALKPHOS 67 09/15/2022    TP 7 3 09/15/2022    ALB 4 1 09/15/2022    TBILI 0 56 09/15/2022    CHOLESTEROL 195 09/15/2022    HDL 41 (L) 09/15/2022    TRIG 87 09/15/2022    LDLCALC 137 (H) 09/15/2022    [place] 154 09/15/2022    [id number] 3 023 09/15/2022    PREGUR negative 09/11/2022       Imaging Studies: XR chest 1 view portable    Result Date: 9/12/2022  Narrative: CHEST INDICATION:   chest pain  COMPARISON:  None EXAM PERFORMED/VIEWS:  XR CHEST PORTABLE Images: 2 FINDINGS: Cardiomediastinal silhouette appears unremarkable  The lungs are clear  No pneumothorax or pleural effusion  Osseous structures appear within normal limits for patient age  Impression: No acute cardiopulmonary disease  Workstation performed: [id number]       Code Status: Full    Assessment/Plan   Principal Problem:    Bipolar 1 disorder ([facility] [address] )      Treatment Plan:     Planned Treatment and Medication Changes:         All current active medications have been reviewed  Encourage group therapy, milieu therapy and occupational therapy  Behavioral Health checks every 7 minutes  Continue Zyprexa, medication indicated for psychotic symptoms, such aas hearing voices and having paranoid ideations,  and mood disorder    Augmentation with topiramae for mood disorder, impulsive acts and slef-harmfl behavior offlabel  Topaam may help with weight gain, usually associated with Zyprexa, and also with headaches, the patient reported       Current Facility-Administered Medications   Medication Dose Route Frequency Provider Last Rate    acetaminophen  650 mg Oral Q6H PRN Navarre Pane, DO      acetaminophen  650 mg Oral Q4H PRN Emilia Kalaga, DO      acetaminophen  975 mg Oral Q6H PRN Emilia Kalaga, DO      aluminum-magnesium hydroxide-simethicone  30 mL Oral Q4H PRN Emilia Kalaga, DO      ARIPiprazole  2 mg Oral Daily Emilia Kalaga, DO      benztropine  1 mg Intramuscular Q4H PRN Max 6/day Emilia Kalaga, DO      benztropine  1 mg Oral Q4H PRN Max 6/day Emilia Kalaga, DO      cloNIDine  0 1 mg Oral HS [facility]      hydrOXYzine HCL  50 mg Oral Q6H PRN Max 4/day Emilia Kalaga, DO      Or    diphenhydrAMINE  50 mg Intramuscular Q6H PRN Emilia Kalaga, DO      hydrOXYzine HCL  100 mg Oral Q6H PRN Max 4/day Emilia Kalaga, DO      Or    LORazepam  2 mg Intramuscular Q6H PRN Emilia Kalaga, DO      hydrOXYzine HCL  25 mg Oral Q6H PRN Max 4/day Emilia Kalaga, DO      LORazepam  0 5 mg Oral Q6H PRN Emilia Kalaga, DO      OLANZapine  10 mg Oral Q3H PRN Max 3/day Emilai Kalaga, DO      Or    OLANZapine  10 mg Intramuscular Q3H PRN Max 3/day Emilia Kalaga, DO      OLANZapine  5 mg Oral Q3H PRN Max 6/day Emilia Kalaga, DO      Or    OLANZapine  5 mg Intramuscular Q3H PRN Max 6/day Emilia Kalaga, DO      OLANZapine  2 5 mg Oral Q3H PRN Max 8/day Emilia Kalaga, DO      polyethylene glycol  17 g Oral Daily PRN Emilia Kalaga, DO      senna-docusate sodium  1 tablet Oral Daily PRN Emilia Kalaga, DO      traZODone  50 mg Oral HS PRN Raquel Pane, DO         Risks / Benefits of Treatment:    Risks, benefits, and possible side effects of medications explained to patient including risk of parkinsonian symptoms, Tardive Dyskinesia and metabolic syndrome related to treatment with antipsychotic medications  The patient verbalizes understanding and agreement for treatment  Counseling / Coordination of Care:    Patient's presentation on admission and proposed treatment plan discussed with treatment team   Diagnosis, medication changes and treatment plan reviewed with patient  Inpatient Psychiatric Certification:    Estimated length of stay: 7 midnights    Based upon physical, mental and social evaluations, I certify that inpatient psychiatric services are medically necessary for this patient for a duration of 7 midnights for the treatment of Bipolar 1 disorder [facility])    Available alternative community resources do not meet the patient's mental health care needs  I further attest that an established written individualized plan of care has been implemented and is outlined in the patient's medical records  ** Please Note: This note has been constructed using a voice recognition system   **

## 2022-09-15 NOTE — SOCIAL WORK
Pt notified charge nurse regarding pt's current SI and AH commanding pt to hurt specific people  SANDRO suggested single room with no roommate  Charge nurse stated there is no availability to move pt to single room  Sw requested evening/PM staff to keep an eye on pt and her symptoms

## 2022-09-15 NOTE — PROGRESS NOTES
09/15/22   Team Meeting   Meeting Type Daily Rounds   Team Members Present   Team Members Present Physician;Nurse;   Physician Team Member Dr María correa MD, Dr Julien Patel, DO   Nursing Team Member Nolon Meckel, PATRICA   Social Work Team Member Debi [place], [place]   Patient/Family Present   Patient Present No   Patient's Family Present No     New 302, endorses AH/VH, pleasant, med compliant, slept in quiet room after endorses [facility] commanding to HI roommate, med PO PRN effective, nursing to work on pt having single room

## 2022-09-15 NOTE — H&P
Olga Saldana  TSW:[id number] F  AEB:[id number]    [facility]:[id number]  Adm Date: 9/14/2022 1238  12:38 PM   ATT PHY: [place], [address] [facility]         Chief Complaint: worsening depression with suicidal ideations    History of Presenting Illness: Bhakti Browne is a(n) 46 y o  female who is admitted to [address] [place] [address] on involuntary 302 commitment basis  Patient originally presented to [address] [place] [address] ED on 9/11/2022 for worsening depression with suicidal ideations  Patient examined at bedside  Patient currently has no complaints  No Known Allergies    Current Facility-Administered Medications on File Prior to Encounter   Medication Dose Route Frequency Provider Last Rate Last Admin    [DISCONTINUED] ARIPiprazole (ABILIFY) tablet 2 mg  2 mg Oral Daily Nakia Toussaint MD   2 mg at 09/14/22 0926    [DISCONTINUED] cloNIDine (CATAPRES) tablet 0 1 mg  0 1 mg Oral HS Nakia Toussaint MD   0 1 mg at 09/13/22 2159    [DISCONTINUED] LORazepam (ATIVAN) tablet 1 mg  1 mg Oral Q4H PRN Shoshana Gitelman Remaley, DO   1 mg at 09/14/22 0656     No current outpatient medications on file prior to encounter       Active Ambulatory Problems     Diagnosis Date Noted    No Active Ambulatory Problems     Resolved Ambulatory Problems     Diagnosis Date Noted    No Resolved Ambulatory Problems     Past Medical History:   Diagnosis Date    Asthma     Psychiatric disorder      Past Surgical History:   Procedure Laterality Date    TUBAL LIGATION       Social History:   Social History     Socioeconomic History    Marital status: /Civil Union     Spouse name: None    Number of children: None    Years of education: None    Highest education level: None   Occupational History    None   Tobacco Use    Smoking status: Never Smoker    Smokeless tobacco: Never Used   Vaping Use    Vaping Use: Never used   Substance and Sexual Activity    Alcohol use: Yes    Drug use: Yes     Types: Marijuana    Sexual activity: Yes   Other Topics Concern    None   Social History Narrative    None     Social Determinants of Health     Financial Resource Strain: Not on file   Food Insecurity: Not on file   Transportation Needs: Not on file   Physical Activity: Not on file   Stress: Not on file   Social Connections: Not on file   Intimate Partner Violence: Not on file   Housing Stability: Not on file     Family History: History reviewed  No pertinent family history  Review of Systems   All other systems reviewed and are negative  Physical Exam   Constitutional: Awake, alert, in no acute distress  Head: Normocephalic and atraumatic  Mouth/Throat: Oropharynx is clear and moist     Eyes: Conjunctivae and EOM are normal    Neck: Neck supple  Cardiovascular: Normal rate, regular rhythm  Pulmonary/Chest: Effort normal and breath sounds normal   Abdominal: Soft  Bowel sounds are normal  No distension  No tenderness  Neurological: No focal deficits  Musculoskeletal:  Nontender spine  Skin: Skin is warm and dry  Rodrigo Stokes is a(n) 46 y o  female with bipolar disorder  1  Migraines  Patient is on Topamax 25 mg twice daily  2  Arthralgias  Patient may take Tylenol as needed  3  GERD  Stable  Mylanta as needed  4  Psoriasis  Stable  5  Asthma  Patient may use albuterol inhaler as needed  6  Vitamin D insufficiency  Patient started on vitamin D3 1000 units daily  7  Psych with history of bipolar disorder  This is being managed by the psych team     Prognosis: Fair  Discharge Plan: In progress  Advanced Directives: I have discussed in detail with the patient the advanced directives  The patient does not have a POA and does not have a living will  Patient's emergency contact is her spouse, Sarwat Beard, whose number is [phone]    When discussing cardiac and pulmonary resuscitation efforts with the patient, the patient wishes to be FULL CODE  I have spent more than 50 minutes gathering data, doing physical examination, and discussing the advanced directives, which was witnessed by caring staff  The patient was discussed with Dr Jean Gallardo and he is in agreement with the above note

## 2022-09-15 NOTE — NURSING NOTE
Laine Quinones states that her audio hallucinations are getting louder and increasing her anxiety  Her hand have obvious tremors and she is isolating herself from other patients  Given Zyprex 5mg IM right deltoid and Benedryl 15mg Im deltoid  Will continue to monitor and reassess  Rated 31 on Munoz Anxiety scale  Given access to quiet room

## 2022-09-15 NOTE — PROGRESS NOTES
09/15/22 1330   Activity/Group Checklist   Group Pet therapy   Attendance Did not attend  (Pt offered grp; pt remained in their room)

## 2022-09-15 NOTE — PROGRESS NOTES
Pt has the following in her room   Pads   1 green t shirt 1 pair of sneakers 4 pair of undies 1 gray shirt 1 sports bra 1 long sleeve shirt 2 pair of pants 3 coloring books 1 pack of crayons    Storeage  5 socks 1 black shirt 1 box of 28 pads  1 Doselena amezcua

## 2022-09-15 NOTE — NURSING NOTE
Pt anxious and tearful transiently throughout shift  Pt room changed because pt feared to harm room mate in previous shift  Pt complains of A/pacheco/voices that have been background chatter throughout shift  Pt also food paranoid thinking people may poison her, and freqently looking around paranoid someone is behind her  SIB exhibited/superficial scratches to L arm  PO Zyprexa 2 5 administered at 0842  Pt comes to staff approx 1 5 hrs later complaining of anxiety; tearful, and states "that medication did not work"  PRN PO atarax 100mg administered at 10:15  Pt describe decreased anxiety, but still complains of voices and paranoia  PO Zyprexa 10mg PRN administered at 1208, and effective  Pt describes feeling "much better", and spends time out visible in the community on phone, and coloring in dining room  Pt again complains of mild anxiety  PO Atarax 50mg administered and effective  Pt spends time in room looking out window  Pt verbally agrees to notify staff of SI or thoughts of SIB  Will cont to remain free from injury  Staff will cont Q7 rounding and offer therapeutic support and positive encouragement

## 2022-09-16 LAB
ATRIAL RATE: 92 BPM
P AXIS: 59 DEGREES
PR INTERVAL: 164 MS
QRS AXIS: 56 DEGREES
QRSD INTERVAL: 80 MS
QT INTERVAL: 366 MS
QTC INTERVAL: 452 MS
T WAVE AXIS: 64 DEGREES
VENTRICULAR RATE: 92 BPM

## 2022-09-16 PROCEDURE — 99232 SBSQ HOSP IP/OBS MODERATE 35: CPT | Performed by: NURSE PRACTITIONER

## 2022-09-16 RX ORDER — PALIPERIDONE 3 MG/1
3 TABLET, EXTENDED RELEASE ORAL DAILY
Status: DISCONTINUED | OUTPATIENT
Start: 2022-09-17 | End: 2022-09-18

## 2022-09-16 RX ORDER — HALOPERIDOL 5 MG/1
5 TABLET ORAL DAILY PRN
Status: DISCONTINUED | OUTPATIENT
Start: 2022-09-16 | End: 2022-09-27 | Stop reason: HOSPADM

## 2022-09-16 RX ADMIN — CHOLECALCIFEROL TAB 25 MCG (1000 UNIT) 1000 UNITS: 25 TAB at 08:55

## 2022-09-16 RX ADMIN — TOPIRAMATE 25 MG: 25 TABLET, FILM COATED ORAL at 17:14

## 2022-09-16 RX ADMIN — HYDROXYZINE HYDROCHLORIDE 100 MG: 50 TABLET, FILM COATED ORAL at 08:01

## 2022-09-16 RX ADMIN — OLANZAPINE 5 MG: 5 TABLET, ORALLY DISINTEGRATING ORAL at 08:55

## 2022-09-16 RX ADMIN — OLANZAPINE 10 MG: 10 TABLET, ORALLY DISINTEGRATING ORAL at 21:04

## 2022-09-16 RX ADMIN — OLANZAPINE 10 MG: 10 TABLET, FILM COATED ORAL at 10:38

## 2022-09-16 RX ADMIN — TOPIRAMATE 25 MG: 25 TABLET, FILM COATED ORAL at 08:55

## 2022-09-16 RX ADMIN — HYDROXYZINE HYDROCHLORIDE 100 MG: 50 TABLET, FILM COATED ORAL at 15:32

## 2022-09-16 RX ADMIN — CLONIDINE HYDROCHLORIDE 0.1 MG: 0.1 TABLET ORAL at 21:04

## 2022-09-16 NOTE — NURSING NOTE
Presents with complaints of severe anxiety of unknown cause,HOOD  Offereed and accepted 100 mg of atarax with education on expected therapeutics and SE to report  Will monitor FRASER of 27

## 2022-09-16 NOTE — PROGRESS NOTES
Progress Note - Mariluz Vargas 46 y o  female MRN: [id number]    Unit/Bed#: [facility] 224-02 Encounter: [id number]        Subjective:   Patient seen and examined at bedside after reviewing the chart and discussing the case with the caring staff  Patient examined at bedside  Patient has no acute concerns  Physical Exam   Vitals: Blood pressure 112/77, pulse 87, temperature (!) 97 2 °F (36 2 °C), temperature source Temporal, resp  rate 16, height 5' 5" (1 651 m), weight 84 2 kg (185 lb 9 6 oz), last menstrual period 08/20/2022, SpO2 99 %  ,Body mass index is 30 89 kg/m²  Constitutional: Patient in no acute distress  HEENT: PERR, EOMI, MMM  Cardiovascular: Normal rate and regular rhythm  Pulmonary/Chest: Effort normal and breath sounds normal    Abdomen: Non distended  Assessment/Plan:  Mariluz Vargas is a(n) 46 y o  female with bipolar disorder      1  Migraines  Patient is on Topamax 25 mg twice daily  2  Arthralgias  Patient may take Tylenol as needed  3  GERD  Stable  Mylanta as needed  4  Psoriasis  Stable  5  Asthma  Patient may use albuterol inhaler as needed  6  Vitamin D deficiency  Patient started on vitamin D3 1000 units daily  The patient was discussed with Dr David Rogers and he is in agreement with the above note

## 2022-09-16 NOTE — PLAN OF CARE
Problem: Depression  Goal: Refrain from isolation  Description: Interventions:  - Develop a trusting relationship   - Encourage socialization   Outcome: Progressing  Goal: Refrain from self-neglect  Outcome: Progressing     Problem: Anxiety  Goal: Anxiety is at manageable level  Description: Interventions:  - Assess and monitor patient's anxiety level  - Monitor for signs and symptoms (heart palpitations, chest pain, shortness of breath, headaches, nausea, feeling jumpy, restlessness, irritable, apprehensive)  - Collaborate with interdisciplinary team and initiate plan and interventions as ordered    - [place] patient to unit/surroundings  - Explain treatment plan  - Encourage participation in care  - Encourage verbalization of concerns/fears  - Identify coping mechanisms  - Assist in developing anxiety-reducing skills  - Administer/offer alternative therapies  - Limit or eliminate stimulants  Outcome: Progressing

## 2022-09-16 NOTE — PROGRESS NOTES
09/16/22   Team Meeting   Meeting Type Daily Rounds   Team Members Present   Team Members Present Physician;Nurse;   Physician Team Member Dr Angelika Campbell MD; Dr Renae Barlow, DO; HOSP DR ABDIEL ADAMS, [address] [place] [address]   Nursing Team Member Reza Otero, PATRICA   [facility]   Patient/Family Present   Patient Present No   Patient's Family Present No     Room change due to HI towards roommate, no noted behaviors

## 2022-09-16 NOTE — NURSING NOTE
Patient visible on milieu  Report auditory hallucinations,use tablet to listen to music as a coping skills  PRN  Medication given with some effects  Continue on safety checks

## 2022-09-16 NOTE — PROGRESS NOTES
09/16/22 0702   Activity/Group Checklist   Group Community meeting  (Patient check in with coffee/ patients eating and drink in room for covid precautions)   Attendance Attended   Attendance Duration (min) 16-30   Interactions Interacted appropriately   Affect/Mood Appropriate   Goals Achieved Identified feelings; Able to listen to others; Able to engage in interactions; Able to self-disclose; Able to recieve feedback

## 2022-09-16 NOTE — NURSING NOTE
Patient slept all night  No signs of distress or discomfort  Safety checks maintained  Will continue to monitor

## 2022-09-16 NOTE — PLAN OF CARE
Problem: Alteration in Thoughts and Perception  Goal: Verbalize thoughts and feelings  Description: Interventions:  - Promote a nonjudgmental and trusting relationship with the patient through active listening and therapeutic communication  - Assess patient's level of functioning, behavior and potential for risk  - Engage patient in 1 on 1 interactions  - Encourage patient to express fears, feelings, frustrations, and discuss symptoms    - [place] patient to reality, help patient recognize reality-based thinking   - Administer medications as ordered and assess for potential side effects  - Provide the patient education related to the signs and symptoms of the illness and desired effects of prescribed medications  Outcome: Progressing  Goal: Agree to be compliant with medication regime, as prescribed and report medication side effects  Description: Interventions:  - Offer appropriate PRN medication and supervise ingestion; conduct AIMS, as needed   Outcome: Progressing  Goal: Recognize dysfunctional thoughts, communicate reality-based thoughts at the time of discharge  Description: Interventions:  - Provide medication and psycho-education to assist patient in compliance and developing insight into his/her illness   Outcome: Progressing     Problem: Depression  Goal: Refrain from isolation  Description: Interventions:  - Develop a trusting relationship   - Encourage socialization   Outcome: Progressing
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
## 2022-09-16 NOTE — TREATMENT PLAN
TREATMENT PLAN REVIEW - [facility] Ronda 46 y o  1980 female MRN: [id number]    [address] Room / Bed: [place] [address]/[facility] 784-77 Encounter: [id number]          Admit Date/Time:  9/14/2022 12:38 PM    Treatment Team: Attending Provider: Saumya Espinosa MD; : Rosario Herbert; Registered Nurse: Aliya Gaona RN    Diagnosis: Principal Problem:    Bipolar 1 disorder [facility]    Patient Strengths: average or above intelligence, capable of independent living, cooperative, communication skills, compliant with medication     Patient Barriers: chronic mental illness    Short Term Goals: decrease in depressive symptoms, decrease in paranoid thoughts, decrease in psychotic symptoms    Long Term Goals: resolution of depressive symptoms, stabilization of mood, free of suicidal thoughts, free of homicidal thoughts, no self abusive behavior, resolution of psychotic symptoms, improved reality testing, improved reasoning ability, improved impulse control    Progress Towards Goals: starting psychitric medications as prescribed    Recommended Treatment: medication management, patient medication education, group therapy, milieu therapy, continued Behavioral Health psychiatric evaluation/assessment process     Treatment Frequency: daily medication monitoring, group and milieu therapy daily, monitoring through interdisciplinary rounds, monitoring through weekly patient care conferences    Expected Discharge Date:  tbd    Discharge Plan: referral for outpatient medication management with a psychiatrist, referral for outpatient psychotherapy    Treatment Plan Created/Updated By: Yeimy Damon MD

## 2022-09-16 NOTE — NURSING NOTE
Assumed care of patient at 1900  Is visible on the unit  Talks on the phone or listens to tablet in room  Denies any pain or current SI/HI/VH  Complained of AH  At 2030, given scheduled night dose of Zyprexa 10 at 2045  Patient remains to have good results from the Zyprexa, but seems to only last about 8 hours  Ate snack  Safety checks maintained  Will continue to monitor

## 2022-09-17 PROCEDURE — 87205 SMEAR GRAM STAIN: CPT | Performed by: FAMILY MEDICINE

## 2022-09-17 PROCEDURE — 87070 CULTURE OTHR SPECIMN AEROBIC: CPT | Performed by: FAMILY MEDICINE

## 2022-09-17 PROCEDURE — 99232 SBSQ HOSP IP/OBS MODERATE 35: CPT | Performed by: HOSPITALIST

## 2022-09-17 RX ORDER — SULFAMETHOXAZOLE AND TRIMETHOPRIM 800; 160 MG/1; MG/1
1 TABLET ORAL EVERY 12 HOURS SCHEDULED
Status: COMPLETED | OUTPATIENT
Start: 2022-09-17 | End: 2022-09-27

## 2022-09-17 RX ADMIN — TOPIRAMATE 25 MG: 25 TABLET, FILM COATED ORAL at 08:22

## 2022-09-17 RX ADMIN — TRAZODONE HYDROCHLORIDE 50 MG: 50 TABLET ORAL at 23:12

## 2022-09-17 RX ADMIN — OLANZAPINE 10 MG: 10 TABLET, FILM COATED ORAL at 12:30

## 2022-09-17 RX ADMIN — HALOPERIDOL 5 MG: 5 TABLET ORAL at 17:05

## 2022-09-17 RX ADMIN — OLANZAPINE 10 MG: 10 TABLET, ORALLY DISINTEGRATING ORAL at 21:57

## 2022-09-17 RX ADMIN — ACETAMINOPHEN 325MG 975 MG: 325 TABLET ORAL at 15:33

## 2022-09-17 RX ADMIN — HYDROXYZINE HYDROCHLORIDE 100 MG: 50 TABLET, FILM COATED ORAL at 10:23

## 2022-09-17 RX ADMIN — HYDROXYZINE HYDROCHLORIDE 100 MG: 50 TABLET, FILM COATED ORAL at 17:05

## 2022-09-17 RX ADMIN — SULFAMETHOXAZOLE AND TRIMETHOPRIM 1 TABLET: 800; 160 TABLET ORAL at 15:32

## 2022-09-17 RX ADMIN — TOPIRAMATE 25 MG: 25 TABLET, FILM COATED ORAL at 17:05

## 2022-09-17 RX ADMIN — OLANZAPINE 5 MG: 5 TABLET, FILM COATED ORAL at 20:05

## 2022-09-17 RX ADMIN — CLONIDINE HYDROCHLORIDE 0.1 MG: 0.1 TABLET ORAL at 21:57

## 2022-09-17 RX ADMIN — PALIPERIDONE 3 MG: 3 TABLET, EXTENDED RELEASE ORAL at 08:22

## 2022-09-17 RX ADMIN — CHOLECALCIFEROL TAB 25 MCG (1000 UNIT) 1000 UNITS: 25 TAB at 08:22

## 2022-09-17 NOTE — PLAN OF CARE
Problem: Risk for Self Injury/Neglect  Goal: Treatment Goal: Remain safe during length of stay, learn and adopt new coping skills, and be free of self-injurious ideation, impulses and acts at the time of discharge  Outcome: Progressing  Goal: Refrain from harming self  Description: Interventions:  - Monitor patient closely, per order  - Develop a trusting relationship  - Supervise medication ingestion, monitor effects and side effects   Outcome: Progressing  Goal: Recognize maladaptive responses and adopt new coping mechanisms  Outcome: Progressing  Goal: Complete daily ADLs, including personal hygiene independently, as able  Description: Interventions:  - Observe, teach, and assist patient with ADLS  - Monitor and promote a balance of rest/activity, with adequate nutrition and elimination  Outcome: Progressing     Problem: Depression  Goal: Refrain from isolation  Description: Interventions:  - Develop a trusting relationship   - Encourage socialization   Outcome: Progressing  Goal: Refrain from self-neglect  Outcome: Progressing     Problem: Anxiety  Goal: Anxiety is at manageable level  Description: Interventions:  - Assess and monitor patient's anxiety level  - Monitor for signs and symptoms (heart palpitations, chest pain, shortness of breath, headaches, nausea, feeling jumpy, restlessness, irritable, apprehensive)  - Collaborate with interdisciplinary team and initiate plan and interventions as ordered    - [place] patient to unit/surroundings  - Explain treatment plan  - Encourage participation in care  - Encourage verbalization of concerns/fears  - Identify coping mechanisms  - Assist in developing anxiety-reducing skills  - Administer/offer alternative therapies  - Limit or eliminate stimulants  Outcome: Progressing

## 2022-09-17 NOTE — NURSING NOTE
Patient visible on milieu  Pleasant and cooperative  Endorse auditory hallucination PRN  Effective as ordered  Use tablet for positive coping  Denies depression, HI, SI  Schedule PO medication administered as ordered  Appetite good    Continue on safety check

## 2022-09-17 NOTE — PROGRESS NOTES
Progress Note - Jennifer Ramirez 46 y o  female MRN: [id number]    Unit/Bed#: [facility] 224-02 Encounter: [id number]        Subjective:   Patient seen and examined at bedside after reviewing the chart and discussing the case with the caring staff  Patient examined at bedside  Patient has no acute concerns  Physical Exam   Vitals: Blood pressure 104/60, pulse 84, temperature 97 8 °F (36 6 °C), temperature source Temporal, resp  rate 16, height 5' 5" (1 651 m), weight 79 kg (174 lb 3 2 oz), last menstrual period 08/20/2022, SpO2 98 %  ,Body mass index is 28 99 kg/m²  Constitutional: Patient in no acute distress  HEENT: PERR, EOMI, MMM  Cardiovascular: Normal rate and regular rhythm  Pulmonary/Chest: Effort normal and breath sounds normal    Abdomen: Non distended  Right breast   Purulent discharge from lateral side of the nipple/areola with moderate tenderness  No axillary lymphadenopathy and no lump found on bilateral breast exam    Assessment/Plan:  Jennifer Ramirez is a(n) 46 y o  female with bipolar disorder      1  Migraines  Patient is on Topamax 25 mg twice daily  2  Arthralgias  Patient may take Tylenol as needed  3  GERD  Stable  Mylanta as needed  4  Psoriasis  Stable  5  Asthma  Patient may use albuterol inhaler as needed  6  Vitamin D deficiency  Patient started on vitamin D3 1000 units daily  7  Right breast abscess/mastitis  I will put the patient on Bactrim DS for 10 days  I will also send the wound cultures for Gram stain and culture and sensitivity    I

## 2022-09-17 NOTE — PLAN OF CARE
Problem: Alteration in Thoughts and Perception  Goal: Verbalize thoughts and feelings  Description: Interventions:  - Promote a nonjudgmental and trusting relationship with the patient through active listening and therapeutic communication  - Assess patient's level of functioning, behavior and potential for risk  - Engage patient in 1 on 1 interactions  - Encourage patient to express fears, feelings, frustrations, and discuss symptoms    - [place] patient to reality, help patient recognize reality-based thinking   - Administer medications as ordered and assess for potential side effects  - Provide the patient education related to the signs and symptoms of the illness and desired effects of prescribed medications  Outcome: Progressing     Problem: Alteration in Thoughts and Perception  Goal: Agree to be compliant with medication regime, as prescribed and report medication side effects  Description: Interventions:  - Offer appropriate PRN medication and supervise ingestion; conduct AIMS, as needed   Outcome: Progressing     Problem: Ineffective Coping  Goal: Identifies healthy coping skills  Outcome: Progressing     Problem: Risk for Self Injury/Neglect  Goal: Refrain from harming self  Description: Interventions:  - Monitor patient closely, per order  - Develop a trusting relationship  - Supervise medication ingestion, monitor effects and side effects   Outcome: Progressing     Problem: Depression  Goal: Refrain from isolation  Description: Interventions:  - Develop a trusting relationship   - Encourage socialization   Outcome: Progressing

## 2022-09-17 NOTE — NURSING NOTE
Patient c/o  pus drainage from right breast  Provider assess area no pus note  Right  breast was  Swab result pending,
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
## 2022-09-17 NOTE — NURSING NOTE
Patient appears to have slept through the overnight hours without issue  No complaints voiced  No acute behaviors observed during HS  She remains in bed sleeping at this time  Continuous safety rounding in progress
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
## 2022-09-18 PROCEDURE — 99232 SBSQ HOSP IP/OBS MODERATE 35: CPT | Performed by: HOSPITALIST

## 2022-09-18 RX ORDER — PALIPERIDONE 6 MG/1
6 TABLET, EXTENDED RELEASE ORAL DAILY
Status: DISCONTINUED | OUTPATIENT
Start: 2022-09-19 | End: 2022-09-19

## 2022-09-18 RX ADMIN — CHOLECALCIFEROL TAB 25 MCG (1000 UNIT) 1000 UNITS: 25 TAB at 08:29

## 2022-09-18 RX ADMIN — PALIPERIDONE 3 MG: 3 TABLET, EXTENDED RELEASE ORAL at 08:29

## 2022-09-18 RX ADMIN — HYDROXYZINE HYDROCHLORIDE 100 MG: 50 TABLET, FILM COATED ORAL at 09:20

## 2022-09-18 RX ADMIN — LORAZEPAM 0.5 MG: 0.5 TABLET ORAL at 14:51

## 2022-09-18 RX ADMIN — TOPIRAMATE 25 MG: 25 TABLET, FILM COATED ORAL at 17:23

## 2022-09-18 RX ADMIN — OLANZAPINE 10 MG: 10 TABLET, FILM COATED ORAL at 11:19

## 2022-09-18 RX ADMIN — CLONIDINE HYDROCHLORIDE 0.1 MG: 0.1 TABLET ORAL at 21:02

## 2022-09-18 RX ADMIN — SULFAMETHOXAZOLE AND TRIMETHOPRIM 1 TABLET: 800; 160 TABLET ORAL at 08:29

## 2022-09-18 RX ADMIN — OLANZAPINE 10 MG: 10 TABLET, ORALLY DISINTEGRATING ORAL at 21:02

## 2022-09-18 RX ADMIN — SULFAMETHOXAZOLE AND TRIMETHOPRIM 1 TABLET: 800; 160 TABLET ORAL at 21:03

## 2022-09-18 RX ADMIN — HYDROXYZINE HYDROCHLORIDE 25 MG: 25 TABLET ORAL at 19:26

## 2022-09-18 RX ADMIN — TOPIRAMATE 25 MG: 25 TABLET, FILM COATED ORAL at 08:29

## 2022-09-18 NOTE — NURSING NOTE
Pt stated to this writer while during group she started having the negative thoughts after speaking about her greatest accomplishment which she stated was coming here to get help patient became tearful and was shaking stated she wanted the voices to go away so gave patient 10mg zyprexa PO   Will monitor for effectiveness

## 2022-09-18 NOTE — NURSING NOTE
Pt requested something for anxiety at 0920 stated it was r/t worrying about the next steps in her treatment reassured patient everything takes time and to just things day by day   Pt velasco score was 25 so gave pt 100mg of atarax will monitor for effectiveness

## 2022-09-18 NOTE — NURSING NOTE
Pt compliant with meds and meals  Patient pleasant and cooperative  C/o intermittent AH but meds are effective  Pt stated 10mg zyprexa that was given at 1122 was effective  Pt also got ativan 0 5 mg at 1451 for increased anxiety which was effective  At times social and visible  Q 7 min behavioral and safety checks in place

## 2022-09-18 NOTE — PLAN OF CARE
Problem: Alteration in Thoughts and Perception  Goal: Verbalize thoughts and feelings  Description: Interventions:  - Promote a nonjudgmental and trusting relationship with the patient through active listening and therapeutic communication  - Assess patient's level of functioning, behavior and potential for risk  - Engage patient in 1 on 1 interactions  - Encourage patient to express fears, feelings, frustrations, and discuss symptoms    - [place] patient to reality, help patient recognize reality-based thinking   - Administer medications as ordered and assess for potential side effects  - Provide the patient education related to the signs and symptoms of the illness and desired effects of prescribed medications  Outcome: Progressing  Goal: Agree to be compliant with medication regime, as prescribed and report medication side effects  Description: Interventions:  - Offer appropriate PRN medication and supervise ingestion; conduct AIMS, as needed   Outcome: Progressing  Goal: Recognize dysfunctional thoughts, communicate reality-based thoughts at the time of discharge  Description: Interventions:  - Provide medication and psycho-education to assist patient in compliance and developing insight into his/her illness   Outcome: Progressing     Problem: Ineffective Coping  Goal: Identifies healthy coping skills  Outcome: Progressing     Problem: Risk for Self Injury/Neglect  Goal: Treatment Goal: Remain safe during length of stay, learn and adopt new coping skills, and be free of self-injurious ideation, impulses and acts at the time of discharge  Outcome: Progressing  Goal: Refrain from harming self  Description: Interventions:  - Monitor patient closely, per order  - Develop a trusting relationship  - Supervise medication ingestion, monitor effects and side effects   Outcome: Progressing  Goal: Recognize maladaptive responses and adopt new coping mechanisms  Outcome: Progressing

## 2022-09-18 NOTE — NURSING NOTE
Patient reports loud AH and requesting intervention  Agitation scale score of 32  Given 5 mg zyprexa as ordered, as well as electronic tablet to "drown out"   Will continue to monitor for effectiveness

## 2022-09-18 NOTE — PROGRESS NOTES
Progress Note - [place] [address] 46 y o  female MRN: [id number]   Unit/Bed#: [facility] 224-02 Encounter: [id number]    Behavior over the last 24 hours: minimal improvement  Candice seen today, per staff report has been less internally preocuppied  on the unit  Patient seen today continues to be anxious gets very anxious speaking auditory hallucinations  She does report improvement in auditory hallucination but continues to some  As we discussed her medications she becomes distracted and states" the voice is telling me to not take it because it will disappear"     Patient is given support through this as she is quite disturbed by it  With improvement patient fears that there is possibility of regression and she will have incapacitated functioning again  Patient is advised of how medications work and that we hope to achieve full remission of her symptom  Patient responds feeling more relaxed with this Education  Patient continues to be anxious, has auditory hallucinations, increased distress with auditory hallucinations           Mental Status Evaluation:    Appearance:  casually dressed, adequate grooming, looks stated age   Behavior:  cooperative   Speech:  slow, soft   Mood:  anxious   Affect:  blunted   Thought Process:  linear   Associations: intact associations   Thought Content:  paranoid ideation   Perceptual Disturbances: auditory hallucinations   Risk Potential: Suicidal ideation - None at present  Homicidal ideation - None at present   Sensorium:  oriented to person, place and time/date   Memory:  recent and remote memory grossly intact   Consciousness:  alert and awake   Attention: attention span and concentration are age appropriate   Insight:  improving and limited   Judgment: improving and limited   Gait/Station: normal gait/station   Motor Activity: no abnormal movements     Vital signs in last 24 hours:    Temp:  [97 9 °F (36 6 °C)-98 °F (36 7 °C)] 98 °F (36 7 °C)  HR:  [59-96] 59  Resp:  [16-18] 18  BP: ()/(57-77) 93/57    Laboratory results: I have personally reviewed all pertinent laboratory/tests results  Assessment/Plan   Principal Problem:    Bipolar 1 disorder ([address] )    Recommended Treatment:     Planned medication and treatment changes:  Increase Invega 6 mg daily beginning on Monday September 19th, continue Zyprexa 10 mg daily at bedtime which will be decreased tomorrow  Plan to start Invega long-acting injection prior to discharge    All current active medications have been reviewed  Encourage group therapy, milieu therapy and occupational therapy  Behavioral Health checks every 7 minutes  Current Facility-Administered Medications   Medication Dose Route Frequency Provider Last Rate    acetaminophen  650 mg Oral Q6H PRN Lavena Labor, DO      acetaminophen  650 mg Oral Q4H PRN Emilia Kalaga, DO      acetaminophen  975 mg Oral Q6H PRN Emilia Kalaga, DO      albuterol  2 puff Inhalation Q4H PRN Renetta Baker PA-C      aluminum-magnesium hydroxide-simethicone  30 mL Oral Q4H PRN Emilia Kalaga, DO      benztropine  1 mg Intramuscular Q4H PRN Max 6/day Emilia Kalaga, DO      benztropine  1 mg Oral Q4H PRN Max 6/day Lavena Labor, DO      cholecalciferol  1,000 Units Oral Daily Renetta Baker PA-C      cloNIDine  0 1 mg Oral HS Emilia Kalaga, DO      hydrOXYzine HCL  50 mg Oral Q6H PRN Max 4/day Emilia Kalaga, DO      Or    diphenhydrAMINE  50 mg Intramuscular Q6H PRN Emilia Kalaga, DO      haloperidol  5 mg Oral Daily PRN BRIANNA TapiaNP      hydrOXYzine HCL  100 mg Oral Q6H PRN Max 4/day Lavena Labor, DO      Or    LORazepam  2 mg Intramuscular Q6H PRN Emilia Kalaga, DO      hydrOXYzine HCL  25 mg Oral Q6H PRN Max 4/day Emilia Kalaga, DO      LORazepam  0 5 mg Oral Q6H PRN Emilia Kalaga, DO      OLANZapine  10 mg Oral HS Sol Pulliam MD      OLANZapine  10 mg Oral Q3H PRN Max 3/day Lavena Labor, DO      Or  OLANZapine  10 mg Intramuscular Q3H PRN Max 3/day Emilia Dannaga, DO      OLANZapine  5 mg Oral Q3H PRN Max 6/day Emilia Dannaga, DO      Or    OLANZapine  5 mg Intramuscular Q3H PRN Max 6/day Vidalie Magdi, DO      OLANZapine  2 5 mg Oral Q3H PRN Max 8/day Emilia Kalaga, DO      paliperidone  3 mg Oral Daily DANISHA Mitchell      polyethylene glycol  17 g Oral Daily PRN Xochilt Fairbanks, DO      senna-docusate sodium  1 tablet Oral Daily PRN Xochilt Fairbanks, DO      sulfamethoxazole-trimethoprim  1 tablet Oral Q12H [address] Alana Garcia MD      topiramate  25 mg Oral BID Berhane Stacy MD      traZODone  50 mg Oral HS PRN Xochilt Fairbanks, DO         Risks / Benefits of Treatment:    Risks, benefits, and possible side effects of medications explained to patient and patient verbalizes understanding and agreement for treatment  Counseling / Coordination of Care: Total floor / unit time spent today 25 minutes  Greater than 50% of total time was spent with the patient and / or family counseling and / or coordination of care  A description of counseling / coordination of care:  Patient's progress discussed with staff in treatment team meeting  Medications, treatment progress and treatment plan reviewed with patient      Ata Saeed MD 09/18/22

## 2022-09-18 NOTE — PROGRESS NOTES
Progress Note - Rajiv Vieyra 46 y o  female MRN: [id number]    Unit/Bed#: [facility] 224-02 Encounter: [id number]        Subjective:   Patient seen and examined at bedside after reviewing the chart and discussing the case with the caring staff  Patient examined at bedside  Patient has no acute concerns  Patient's wound culture is still pending  Physical Exam   Vitals: Blood pressure 105/67, pulse 75, temperature 97 8 °F (36 6 °C), temperature source Temporal, resp  rate 16, height 5' 5" (1 651 m), weight 79 kg (174 lb 3 2 oz), last menstrual period 08/20/2022, SpO2 97 %  ,Body mass index is 28 99 kg/m²  Constitutional: Patient in no acute distress  HEENT: PERR, EOMI, MMM  Cardiovascular: Normal rate and regular rhythm  Pulmonary/Chest: Effort normal and breath sounds normal    Abdomen: Non distended  Right breast   Purulent discharge from lateral side of the nipple/areola with moderate tenderness  No axillary lymphadenopathy and no lump found on bilateral breast exam    Assessment/Plan:  Rajiv Vieyra is a(n) 46 y o  female with bipolar disorder      1  Migraines  Patient is on Topamax 25 mg twice daily  2  Arthralgias  Patient may take Tylenol as needed  3  GERD  Stable  Mylanta as needed  4  Psoriasis  Stable  5  Asthma  Patient may use albuterol inhaler as needed  6  Vitamin D deficiency  Patient started on vitamin D3 1000 units daily  7  Right breast abscess/mastitis  I will put the patient on Bactrim DS for 10 days  I will also send the wound cultures for Gram stain and culture and sensitivity

## 2022-09-18 NOTE — NURSING NOTE
Patient reports moderate effectiveness from PRN zyprexa, reports making AH tolerable  Patient visibly more calm  During assessment patient states that she has been having CAH at home that have been interfering with her life and making her want to hurt herself and others  Patient reports a hx of noncompliance as well as hiding the face she heard AH due to embarrassment  Patient offered reassurance and provided education on antipsychotics  She currently denies SI/HI  Reports AH  Denies VH  Social with select peers  States she is hopeful  Behaviorally controlled  Able to make needs known  Compliant with medications as ordered  Will remain on safety precautions and continual monitoring

## 2022-09-19 LAB
ATRIAL RATE: 96 BPM
P AXIS: 70 DEGREES
PR INTERVAL: 164 MS
QRS AXIS: 52 DEGREES
QRSD INTERVAL: 78 MS
QT INTERVAL: 332 MS
QTC INTERVAL: 417 MS
T WAVE AXIS: 75 DEGREES
VENTRICULAR RATE: 95 BPM

## 2022-09-19 PROCEDURE — 93010 ELECTROCARDIOGRAM REPORT: CPT | Performed by: INTERNAL MEDICINE

## 2022-09-19 PROCEDURE — 99232 SBSQ HOSP IP/OBS MODERATE 35: CPT | Performed by: HOSPITALIST

## 2022-09-19 RX ORDER — OLANZAPINE 5 MG/1
5 TABLET, ORALLY DISINTEGRATING ORAL
Status: DISCONTINUED | OUTPATIENT
Start: 2022-09-19 | End: 2022-09-26

## 2022-09-19 RX ORDER — BENZTROPINE MESYLATE 1 MG/1
1 TABLET ORAL 2 TIMES DAILY
Status: DISCONTINUED | OUTPATIENT
Start: 2022-09-19 | End: 2022-09-27 | Stop reason: HOSPADM

## 2022-09-19 RX ADMIN — BENZTROPINE MESYLATE 1 MG: 1 TABLET ORAL at 18:03

## 2022-09-19 RX ADMIN — CHOLECALCIFEROL TAB 25 MCG (1000 UNIT) 1000 UNITS: 25 TAB at 08:30

## 2022-09-19 RX ADMIN — OLANZAPINE 5 MG: 5 TABLET, ORALLY DISINTEGRATING ORAL at 21:40

## 2022-09-19 RX ADMIN — OLANZAPINE 10 MG: 10 TABLET, FILM COATED ORAL at 19:41

## 2022-09-19 RX ADMIN — TOPIRAMATE 25 MG: 25 TABLET, FILM COATED ORAL at 08:30

## 2022-09-19 RX ADMIN — OLANZAPINE 5 MG: 5 TABLET, FILM COATED ORAL at 16:06

## 2022-09-19 RX ADMIN — LORAZEPAM 0.5 MG: 0.5 TABLET ORAL at 16:06

## 2022-09-19 RX ADMIN — CLONIDINE HYDROCHLORIDE 0.1 MG: 0.1 TABLET ORAL at 21:40

## 2022-09-19 RX ADMIN — SULFAMETHOXAZOLE AND TRIMETHOPRIM 1 TABLET: 800; 160 TABLET ORAL at 20:20

## 2022-09-19 RX ADMIN — HYDROXYZINE HYDROCHLORIDE 100 MG: 50 TABLET, FILM COATED ORAL at 10:13

## 2022-09-19 RX ADMIN — ACETAMINOPHEN 325MG 975 MG: 325 TABLET ORAL at 13:26

## 2022-09-19 RX ADMIN — LORAZEPAM 2 MG: 2 INJECTION INTRAMUSCULAR; INTRAVENOUS at 22:01

## 2022-09-19 RX ADMIN — BENZTROPINE MESYLATE 1 MG: 1 TABLET ORAL at 12:42

## 2022-09-19 RX ADMIN — PALIPERIDONE 6 MG: 6 TABLET, EXTENDED RELEASE ORAL at 08:30

## 2022-09-19 RX ADMIN — HALOPERIDOL 5 MG: 5 TABLET ORAL at 12:42

## 2022-09-19 RX ADMIN — TOPIRAMATE 25 MG: 25 TABLET, FILM COATED ORAL at 18:03

## 2022-09-19 RX ADMIN — SULFAMETHOXAZOLE AND TRIMETHOPRIM 1 TABLET: 800; 160 TABLET ORAL at 08:30

## 2022-09-19 NOTE — PROGRESS NOTES
09/19/22   Team Meeting   Meeting Type Daily Rounds   Team Members Present   Team Members Present Physician;Nurse;   Physician Team Member Dr Sanjuanita Ch MD; Dr Barrington Fitch, DO; Lauren Osullivan PA-C   [facility]   Social Work Team Member Manpreet Manzanares [place]   Patient/Family Present   Patient Present No   Patient's Family Present No     Possible d/c end of week, polite, more social, withdrawn sometimes, med comp, AH required PRN yesterday, improved mood, plan: DALEY starts tomorrow, decrease zyprexa

## 2022-09-19 NOTE — PROGRESS NOTES
Progress Note - Gustavo Stevenson 46 y o  female MRN: [id number]    Unit/Bed#: [facility] 224-02 Encounter: [id number]        Subjective:   Patient seen and examined at bedside after reviewing the chart and discussing the case with the caring staff  Patient examined at bedside  Patient has no acute concerns  Patient's wound culture is still pending  Physical Exam   Vitals: Blood pressure 101/59, pulse 80, temperature 97 8 °F (36 6 °C), temperature source Temporal, resp  rate 16, height 5' 5" (1 651 m), weight 79 kg (174 lb 3 2 oz), last menstrual period 08/20/2022, SpO2 97 %  ,Body mass index is 28 99 kg/m²  Constitutional: Patient in no acute distress  HEENT: PERR, EOMI, MMM  Cardiovascular: Normal rate and regular rhythm  Pulmonary/Chest: Effort normal and breath sounds normal    Abdomen: Non distended  Right breast   Purulent discharge from lateral side of the nipple/areola with moderate tenderness  No axillary lymphadenopathy and no lump found on bilateral breast exam    Assessment/Plan:  Gustavo Stevenson is a(n) 46 y o  female with bipolar disorder      1  Migraines  Patient is on Topamax 25 mg twice daily  2  Arthralgias  Patient may take Tylenol as needed  3  GERD  Stable  Mylanta as needed  4  Psoriasis  Stable  5  Asthma  Patient may use albuterol inhaler as needed  6  Vitamin D deficiency  Patient started on vitamin D3 1000 units daily  7  Right breast abscess/mastitis  I will put the patient on Bactrim DS for 10 days  I will also send the wound cultures for Gram stain and culture and sensitivity

## 2022-09-19 NOTE — PLAN OF CARE
Problem: Ineffective Coping  Goal: Identifies ineffective coping skills  Outcome: Progressing  Goal: Demonstrates healthy coping skills  Outcome: Progressing     Problem: Risk for Self Injury/Neglect  Goal: Recognize maladaptive responses and adopt new coping mechanisms  Outcome: Progressing     Problem: Anxiety  Goal: Anxiety is at manageable level  Description: Interventions:  - Assess and monitor patient's anxiety level  - Monitor for signs and symptoms (heart palpitations, chest pain, shortness of breath, headaches, nausea, feeling jumpy, restlessness, irritable, apprehensive)  - Collaborate with interdisciplinary team and initiate plan and interventions as ordered  - [place] patient to unit/surroundings  - Explain treatment plan  - Encourage participation in care  - Encourage verbalization of concerns/fears  - Identify coping mechanisms  - Assist in developing anxiety-reducing skills  - Administer/offer alternative therapies  - Limit or eliminate stimulants  Outcome: Not Progressing     Patient is able to identify poor coping skills and is working on adapting new ones, she continue to struggle with overwhelming anxiety

## 2022-09-19 NOTE — PROGRESS NOTES
Progress Note - [place] [address] 46 y o  female MRN: [id number]   Unit/Bed#: [facility] 224-02 Encounter: [id number]    Behavior over the last 24 hours: some improvement  Candice seen today in office  Per staff report, pt has been slightly improving with anxiety, but continues to complain of unit being too loud along with her AH being distracting and difficult to cope with  Pt received PRN for anxiety last night  Patient is calm and cooperative  She is somewhat brighter today and reports improved mood after making a friend on the unit  She reports disturbed sleep due to negative, racing thoughts  She reports elevated anxiety and restlessness and appears anxious during encounter  She reports that her auditory hallucinations have been worse today and telling her that medications will not work, but she states she does not listen to the [facility]  She states that her anxiety has been worse due to the [facility] along with the loud, stimulating environment of the unit  She states wearing earplugs helps her to calm down  She states she is worried that she might hurt herself or hurt someone else, however, she denies active SI or HI and she states that she is able to contract for safety on the unit  She states she has been scratching her arms to self-harm when anxious, but states that she will use healthier coping mechanisms on the unit and agrees to tell staff when she feels anxious or has thoughts to hurt herself  She remains compliant with medication and is agreeable to beginning a long-acting injectable of Invega tomorrow  Pt has improving insight and judgement      Sleep: decreased, interrupted  Appetite: normal  Medication side effects: Restlessness      Mental Status Evaluation:    Appearance:  age appropriate, casually dressed, adequate grooming, well-nourished   Behavior:  pleasant, cooperative, calm, somewhat distracted   Speech:  average rate and volume   Mood:  "Better, anxious," improved   Affect:  more appropriate, somewhat brighter   Thought Process:  linear   Associations: intact associations   Thought Content:  some paranoia, negative thinking, intrusive thoughts   Perceptual Disturbances: auditory hallucinations, denies visual hallucinations when asked   Risk Potential: Suicidal ideation - Yes, passive death wish, contracts for safety on the unit, would talk to staff if not feeling safe on the unit, self abusive thoughts, self abusive behavior  Homicidal ideation - None at present, no plan   Sensorium:  oriented to person, place and time/date         Memory:  recent and remote memory grossly intact      Consciousness:  alert and awake      Attention: attention span and concentration are age appropriate      Insight:  improving      Judgment: improving      Gait/Station: normal gait/station      Motor Activity: no abnormal movements     Vital signs in last 24 hours:    Temp:  [97 8 °F (36 6 °C)] 97 8 °F (36 6 °C)  HR:  [75-80] 80  Resp:  [16] 16  BP: (101-105)/(59-67) 101/59    Laboratory results:   I have personally reviewed all pertinent laboratory/tests results    Most Recent Labs:   Lab Results   Component Value Date    WBC 12 74 (H) 09/15/2022    RBC 4 87 09/15/2022    HGB 14 0 09/15/2022    HCT 44 6 09/15/2022     09/15/2022    RDW 13 7 09/15/2022    NEUTROABS 6 43 09/15/2022    SODIUM 138 09/15/2022    K 3 7 09/15/2022     09/15/2022    CO2 27 09/15/2022    BUN 13 09/15/2022    CREATININE 0 93 09/15/2022    GLUC 78 09/15/2022    GLUF 78 09/15/2022    CALCIUM 9 3 09/15/2022    AST 25 09/15/2022    ALT 8 09/15/2022    ALKPHOS 67 09/15/2022    TP 7 3 09/15/2022    ALB 4 1 09/15/2022    TBILI 0 56 09/15/2022    CHOLESTEROL 195 09/15/2022    HDL 41 (L) 09/15/2022    TRIG 87 09/15/2022    LDLCALC 137 (H) 09/15/2022    [place] 154 09/15/2022    TPA8LUBDIPTU 3 023 09/15/2022    PREGUR negative 09/11/2022         Assessment/Plan   Principal Problem:    Bipolar 1 disorder ([address] )    Recommended Treatment:     Planned medication and treatment changes:  Increase Invega to 9 mg daily  Initiate Invega Sustenna injection 234 mg IM initiation dose tomorrow 09/20  Initiate Cogentin 1 mg BID for restlessness  Decrease Zyprexa to 5 mg QHS  Monitor tomorrow for improvement of psychotic symptoms      All current active medications have been reviewed  Encourage group therapy, milieu therapy and occupational therapy  Continue treatment with group therapy, milieu therapy and occupational therapy  Behavioral Health checks every 7 minutes  Current Facility-Administered Medications   Medication Dose Route Frequency Provider Last Rate    acetaminophen  650 mg Oral Q6H PRN Emilia Kalaga, DO      acetaminophen  650 mg Oral Q4H PRN Emilia Kalaga, DO      acetaminophen  975 mg Oral Q6H PRN Emilia Kalaga, DO      albuterol  2 puff Inhalation Q4H PRN Renetta Baker PA-C      aluminum-magnesium hydroxide-simethicone  30 mL Oral Q4H PRN Emilia Dannaga, DO      benztropine  1 mg Intramuscular Q4H PRN Max 6/day Elizebeth Conrad, DO      benztropine  1 mg Oral Q4H PRN Max 6/day Elizebeth Conrad, DO      benztropine  1 mg Oral BID Laquita Beach MD      cholecalciferol  1,000 Units Oral Daily Renetta Baker PA-C      cloNIDine  0 1 mg Oral HS Emilia Kalaga, DO      hydrOXYzine HCL  50 mg Oral Q6H PRN Max 4/day Emilia Kalaga, DO      Or    diphenhydrAMINE  50 mg Intramuscular Q6H PRN Emilia Kalaga, DO      haloperidol  5 mg Oral Daily PRN DANISHA Busch      hydrOXYzine HCL  100 mg Oral Q6H PRN Max 4/day Elizebeth Conrad, DO      Or    LORazepam  2 mg Intramuscular Q6H PRN Emilia Kalaga, DO      hydrOXYzine HCL  25 mg Oral Q6H PRN Max 4/day Emilia Kalaga, DO      LORazepam  0 5 mg Oral Q6H PRN Emliia Kalaga, DO      OLANZapine  5 mg Oral HS Emilia Kalaga, DO      OLANZapine  10 mg Oral Q3H PRN Max 3/day Emilia Kalaga, DO      Or    OLANZapine  10 mg Intramuscular Q3H PRN Max 3/day Elizebeth Conrad, DO  OLANZapine  5 mg Oral Q3H PRN Max 6/day Emilia Norris DO      Or    OLANZapine  5 mg Intramuscular Q3H PRN Max 6/day Kiki Parker, DO      OLANZapine  2 5 mg Oral Q3H PRN Max 8/day Kiki Daub, DO      [START ON 9/20/2022] paliperidone  9 mg Oral Daily Kimber Harp MD      [START ON 9/20/2022] paliperidone palmitate ER  234 mg Intramuscular Once Kiki Daub, DO      polyethylene glycol  17 g Oral Daily PRN Kiki Daub, DO      senna-docusate sodium  1 tablet Oral Daily PRN Kiki Daub, DO      sulfamethoxazole-trimethoprim  1 tablet Oral Q12H [facility] Mahnaz Aparicio MD      topiramate  25 mg Oral BID Balwinder Pro MD      traZODone  50 mg Oral HS PRN Kiki Daub, DO         Risks / Benefits of Treatment:    Risks, benefits, and possible side effects of medications explained to patient and patient verbalizes understanding and agreement for treatment  Counseling / Coordination of Care: Total floor / unit time spent today 25 minutes  Greater than 50% of total time was spent with the patient and / or family counseling and / or coordination of care  A description of counseling / coordination of care:  Patient's progress discussed with staff in treatment team meeting  Medications, treatment progress and treatment plan reviewed with patient      Kiki Canales DO 09/19/22

## 2022-09-19 NOTE — NURSING NOTE
Patient tearful and agitated, states things are not going well at home  Offered support and time to vent but states she couldn't verbalize her thoughts at this time  10 mg Zyprexa administered for agitation scale 36

## 2022-09-19 NOTE — NURSING NOTE
Patient reports only slight improvement of anxiety  Patient conveys that the unit is stimulating and loud, in combination with her AH it is difficult to cope  Provided ear plugs to decrease stimulation  Patient rates her AH a 6/10 on intensity  She denies current SI/HI and VH  Patient reports that she wishes to feel "normal again"  She reports feeling like she doesn't fit in  Identifies talking to her daughter as a positive occurrence today  Will remain on safety precautions and continual monitoring

## 2022-09-19 NOTE — PROGRESS NOTES
09/19/22 0800   Activity/Group Checklist   Group Community meeting  (Patient check in with coffee/ patients eating and drink in room for covid precautions)   Attendance Attended   Attendance Duration (min) 16-30   Interactions Interacted appropriately   Affect/Mood Appropriate   Goals Achieved Identified feelings; Able to listen to others; Able to engage in interactions; Able to self-disclose; Able to recieve feedback

## 2022-09-20 PROCEDURE — 99232 SBSQ HOSP IP/OBS MODERATE 35: CPT | Performed by: PSYCHIATRY & NEUROLOGY

## 2022-09-20 RX ORDER — ALBUTEROL SULFATE 90 UG/1
2 AEROSOL, METERED RESPIRATORY (INHALATION) EVERY 4 HOURS PRN
Qty: 18 G | Refills: 0
Start: 2022-09-20

## 2022-09-20 RX ORDER — MELATONIN
1000 DAILY
Qty: 30 TABLET | Refills: 0
Start: 2022-09-21

## 2022-09-20 RX ORDER — TOPIRAMATE 25 MG/1
25 TABLET ORAL 2 TIMES DAILY
Qty: 60 TABLET | Refills: 0
Start: 2022-09-20

## 2022-09-20 RX ADMIN — PALIPERIDONE 9 MG: 6 TABLET, EXTENDED RELEASE ORAL at 09:05

## 2022-09-20 RX ADMIN — CLONIDINE HYDROCHLORIDE 0.1 MG: 0.1 TABLET ORAL at 21:30

## 2022-09-20 RX ADMIN — OLANZAPINE 5 MG: 5 TABLET, ORALLY DISINTEGRATING ORAL at 21:30

## 2022-09-20 RX ADMIN — TOPIRAMATE 25 MG: 25 TABLET, FILM COATED ORAL at 17:03

## 2022-09-20 RX ADMIN — CHOLECALCIFEROL TAB 25 MCG (1000 UNIT) 1000 UNITS: 25 TAB at 09:07

## 2022-09-20 RX ADMIN — PALIPERIDONE PALMITATE 234 MG: 234 INJECTION INTRAMUSCULAR at 11:46

## 2022-09-20 RX ADMIN — SULFAMETHOXAZOLE AND TRIMETHOPRIM 1 TABLET: 800; 160 TABLET ORAL at 21:30

## 2022-09-20 RX ADMIN — HYDROXYZINE HYDROCHLORIDE 50 MG: 50 TABLET, FILM COATED ORAL at 10:22

## 2022-09-20 RX ADMIN — SENNOSIDES AND DOCUSATE SODIUM 1 TABLET: 50; 8.6 TABLET ORAL at 22:03

## 2022-09-20 RX ADMIN — SULFAMETHOXAZOLE AND TRIMETHOPRIM 1 TABLET: 800; 160 TABLET ORAL at 09:06

## 2022-09-20 RX ADMIN — BENZTROPINE MESYLATE 1 MG: 1 TABLET ORAL at 09:05

## 2022-09-20 RX ADMIN — TOPIRAMATE 25 MG: 25 TABLET, FILM COATED ORAL at 09:06

## 2022-09-20 RX ADMIN — HYDROXYZINE HYDROCHLORIDE 50 MG: 50 TABLET, FILM COATED ORAL at 20:04

## 2022-09-20 RX ADMIN — BENZTROPINE MESYLATE 1 MG: 1 TABLET ORAL at 17:03

## 2022-09-20 NOTE — PROGRESS NOTES
09/20/22 1030   Activity/Group Checklist   Group   (Self Reflection Check-in with Art Therapy processing)   Attendance Attended   Attendance Duration (min) Greater than 60   Interactions Interacted appropriately   Affect/Mood Appropriate;Bright;Calm   Goals Achieved Identified triggers; Identified feelings; Identified relapse prevention strategies; Discussed coping strategies; Identified resources and support systems; Able to listen to others; Able to engage in interactions; Able to reflect/comment on own behavior;Able to manage/cope with feelings

## 2022-09-20 NOTE — NURSING NOTE
Pt pleasant on approach  Visible on the unit, social with peers  +MMG Spends time journaling and drawing thoughts and feelings as coping skills in place of calling boyfriend, and describes this as effective  Pt also spends time in bedroom listening to music  Pt denies SI/HI, stating "I don't want to hurt myself I am just impulsive when I am upset  Pt + DALEY(Invega 234mg)/tolerated well w/no issue  Pt verbally agrees to notify staff if having increased thoughts of self harm  Pt will cont to remain safe and free from injury  Staff will cont Q7's and offer support as needed

## 2022-09-20 NOTE — PLAN OF CARE
Problem: Risk for Self Injury/Neglect  Goal: Refrain from harming self  Description: Interventions:  - Monitor patient closely, per order  - Develop a trusting relationship  - Supervise medication ingestion, monitor effects and side effects   Outcome: Not Progressing     Problem: Anxiety  Goal: Anxiety is at manageable level  Description: Interventions:  - Assess and monitor patient's anxiety level  - Monitor for signs and symptoms (heart palpitations, chest pain, shortness of breath, headaches, nausea, feeling jumpy, restlessness, irritable, apprehensive)  - Collaborate with interdisciplinary team and initiate plan and interventions as ordered  - [place] patient to unit/surroundings  - Explain treatment plan  - Encourage participation in care  - Encourage verbalization of concerns/fears  - Identify coping mechanisms  - Assist in developing anxiety-reducing skills  - Administer/offer alternative therapies  - Limit or eliminate stimulants  Outcome: Not Progressing    See progress note

## 2022-09-20 NOTE — PROGRESS NOTES
09/20/22 0730   Activity/Group Checklist   Group   (Patient check in with coffee/ patients eating and drink in room for covid precautions)   Attendance Attended   Attendance Duration (min) 31-45   Interactions Interacted appropriately   Affect/Mood Appropriate;Calm   Goals Achieved Identified feelings; Discussed coping strategies; Able to listen to others; Able to engage in interactions; Able to reflect/comment on own behavior;Able to manage/cope with feelings

## 2022-09-20 NOTE — NURSING NOTE
Zyprexa seemingly effective  Patient in milieu socializing with her peers and attending this evenings group  Upon assessment she shares that she hasn't necessarily had a great day but she is trying to feel better  She denies active thoughts of harming herself or anyone else but continues to endorse   States anxiety is high but denied the need for PRN medication at this time  Patient admits to having low self-esteem but states she is working on it  Will remain on safety precautions and continual monitoring

## 2022-09-20 NOTE — SOCIAL WORK
Marlyn Weller (boyfriend) [phone] called to discuss pt's tx, dc planning  Sw explained pt rescinded ESHA due to incident last night  Sw ended call

## 2022-09-20 NOTE — PROGRESS NOTES
09/20/22   Team Meeting   Meeting Type Daily Rounds   Team Members Present   Team Members Present Physician;Nurse;   Physician Team Member Dr Raina Burton MD; Dr Barrington Fitch, DO; Lauren Osullivan PA-C   Nursing Team Member Morgan Alex, PATRICA   Social Work Team Member Manpreet Manzanares, [facility]   Patient/Family Present   Patient Present No   Patient's Family Present No     D/c Friday, visible, endorses anxiety, Ativan 2mg IM given after call with bf yesterday, scratched face, 234mg Invega due today

## 2022-09-20 NOTE — NURSING NOTE
Patient had a bad phone call with significant other who had reportedly picked a fight with her and upset her  Patient began panicking, slammed the phone, ran in her room, crying and screaming, and had scratched her face  Patient administered IM 2 mg ativan  This writer sat with the patient as the medication began to work  Music played as distraction  Patient extremely remorseful for scratching her face  Superficial scratches cleansed  Patient apologetic stating "I was doing so well"  Patient given reassurance and support  Will continue to monitor on q7 minute checks  Patient denies wanting to harm herself

## 2022-09-20 NOTE — NURSING NOTE
Pt presents as anxious  Frequently on phone in the morning  When approached by staff in room, pt tearful, stating "I feel so bad about what happened yesterday  I don't want to hurt myself  I know my relationship is unhealthy"  PRN Atarax 50mg PO administered and effective  Staff encourages pt to focus on [address] and self care  Pt agrees, and decides to not take calls from her boyfriend today  Will cont to monitor pt for anxiety and psychosis

## 2022-09-20 NOTE — PROGRESS NOTES
Progress Note - [place] [address] 46 y o  female MRN: [id number]   Unit/Bed#: [facility] 224-02 Encounter: [id number]    Behavior over the last 24 hours: some improvement  Candice seen today at bedside  Per staff report, pt has been visible on unit  Reportedly, patient escalated last night after boyfriend repeatedly called her accusing her of cheating  She became increasingly anxious and began scratching her face with self-harm intent  She was then given PRN Ativan 2mg IM for agitation, which was effective  Patient appears sad and tearful this morning  She states, "I was so bad yesterday  My boyfriend called saying he found some picture on my phone and was accusing me of things, and I just lost my mind  I was so upset, I was yelling and punched a wall and started scratching my face super hard  They gave me a shot to make me calm down  I feel terrible " Pt states that her SI and auditory hallucinations became much worse last night due to stress after arguing with her boyfriend  She currently endorses commanding AH that are still present, but quieter at times  She rates depression as 5/10 and anxiety as 8/10  She still reports frequent negative, racing thoughts  She continues to reports passive thoughts of death and urges to self-harm, but is able to contract for safety on the unit  She remains agreeable to tell staff when she feels anxious or has thoughts to hurt herself  She states she is looking forward to receiving her [place] injection today  She states she is happier and feels less anxious when talking with her new friend on the unit  She remains compliant with medication  Pt has improving insight and judgement      Sleep: improved  Appetite: normal  Medication side effects: Denies      Mental Status Evaluation:    Appearance:  age appropriate, casually dressed, adequate grooming, well-nourished, superficial scratches on face   Behavior:  cooperative, calm   Speech:  average rate and volume Mood:  depressed, anxious "not good"   Affect:  tearful, labile   Thought Process:  linear   Associations: intact associations   Thought Content:  negative thinking, intrusive thoughts   Perceptual Disturbances: auditory hallucinations with commands to kill self and with commands to harm self, denies visual hallucinations when asked   Risk Potential: Suicidal ideation - Yes, passive death wish, contracts for safety on the unit, would talk to staff if not feeling safe on the unit, self abusive thoughts, self abusive behavior  Homicidal ideation - None at present, no plan   Sensorium:  oriented to person, place and time/date         Memory:  recent and remote memory grossly intact      Consciousness:  alert and awake      Attention: attention span and concentration are age appropriate      Insight:  improving      Judgment: improving      Gait/Station: normal gait/station      Motor Activity: no abnormal movements     Vital signs in last 24 hours:    Temp:  [97 8 °F (36 6 °C)] 97 8 °F (36 6 °C)  HR:  [76] 76  Resp:  [16] 16  BP: ()/(58-62) 111/62    Laboratory results:   I have personally reviewed all pertinent laboratory/tests results    Most Recent Labs:   Lab Results   Component Value Date    WBC 12 74 (H) 09/15/2022    RBC 4 87 09/15/2022    HGB 14 0 09/15/2022    HCT 44 6 09/15/2022     09/15/2022    RDW 13 7 09/15/2022    NEUTROABS 6 43 09/15/2022    SODIUM 138 09/15/2022    K 3 7 09/15/2022     09/15/2022    CO2 27 09/15/2022    BUN 13 09/15/2022    CREATININE 0 93 09/15/2022    GLUC 78 09/15/2022    GLUF 78 09/15/2022    CALCIUM 9 3 09/15/2022    AST 25 09/15/2022    ALT 8 09/15/2022    ALKPHOS 67 09/15/2022    TP 7 3 09/15/2022    ALB 4 1 09/15/2022    TBILI 0 56 09/15/2022    CHOLESTEROL 195 09/15/2022    HDL 41 (L) 09/15/2022    TRIG 87 09/15/2022    LDLCALC 137 (H) 09/15/2022    NONHDLC 154 09/15/2022    [id number] 3 023 09/15/2022    PREGUR negative 09/11/2022         Assessment/Plan Principal Problem:    Bipolar 1 disorder ([facility] [address] )    Recommended Treatment:     Planned medication and treatment changes:  Received Abhay Persons injection 234 mg IM initiation dose today 09/20  Continue Invega to 9 mg daily  Continue Cogentin 1 mg BID for restlessness  Continue Zyprexa to 5 mg QHS  Monitor for improvement of psychotic symptoms      All current active medications have been reviewed  Encourage group therapy, milieu therapy and occupational therapy  Continue treatment with group therapy, milieu therapy and occupational therapy  Behavioral Health checks every 7 minutes  Current Facility-Administered Medications   Medication Dose Route Frequency Provider Last Rate    acetaminophen  650 mg Oral Q6H PRN Emilia Kalaga, DO      acetaminophen  650 mg Oral Q4H PRN Emilia Kalaga, DO      acetaminophen  975 mg Oral Q6H PRN Emilia Kalaga, DO      albuterol  2 puff Inhalation Q4H PRN Renetta Baker PA-C      aluminum-magnesium hydroxide-simethicone  30 mL Oral Q4H PRN Emilia Kalaga, DO      benztropine  1 mg Intramuscular Q4H PRN Max 6/day Ochoa Crumble, DO      benztropine  1 mg Oral Q4H PRN Max 6/day Ochoa Crumble, DO      benztropine  1 mg Oral BID Yola Ng MD      cholecalciferol  1,000 Units Oral Daily Renetta Baker PA-C      cloNIDine  0 1 mg Oral HS Emilia Kalaga, DO      hydrOXYzine HCL  50 mg Oral Q6H PRN Max 4/day Emilia Kalaga, DO      Or    diphenhydrAMINE  50 mg Intramuscular Q6H PRN Emilia Kalaga, DO      haloperidol  5 mg Oral Daily PRN DANISHA Smith      hydrOXYzine HCL  100 mg Oral Q6H PRN Max 4/day Ochoa Crumble, DO      Or    LORazepam  2 mg Intramuscular Q6H PRN Emilia Kalaga, DO      hydrOXYzine HCL  25 mg Oral Q6H PRN Max 4/day Emilia Kalaga, DO      LORazepam  0 5 mg Oral Q6H PRN Emilia Kalaga, DO      OLANZapine  5 mg Oral HS Emilai Kalaga, DO      OLANZapine  10 mg Oral Q3H PRN Max 3/day Emilia Kalaga, DO      Or    OLANZapine  10 mg Intramuscular Q3H PRN Max 3/day Emilia Norris, DO      OLANZapine  5 mg Oral Q3H PRN Max 6/day Lucinda Caruso DO      Or    OLANZapine  5 mg Intramuscular Q3H PRN Max 6/day Lucinda Caruso, DO      OLANZapine  2 5 mg Oral Q3H PRN Max 8/day Emilia Norris, DO      paliperidone  9 mg Oral Daily Cricket Mittal MD      polyethylene glycol  17 g Oral Daily PRN Lucnida Caruso,       senna-docusate sodium  1 tablet Oral Daily PRN Lucinda Caruso, DO      sulfamethoxazole-trimethoprim  1 tablet Oral Q12H [facility] Omayra Christensen MD      topiramate  25 mg Oral BID Candy Spencer MD      traZODone  50 mg Oral HS PRN Lucinda Caruso, DO         Risks / Benefits of Treatment:    Risks, benefits, and possible side effects of medications explained to patient and patient verbalizes understanding and agreement for treatment  Counseling / Coordination of Care: Total floor / unit time spent today 25 minutes  Greater than 50% of total time was spent with the patient and / or family counseling and / or coordination of care  A description of counseling / coordination of care:  Patient's progress discussed with staff in treatment team meeting  Medications, treatment progress and treatment plan reviewed with patient      Lucinda Caruso DO 09/20/22

## 2022-09-20 NOTE — PROGRESS NOTES
Additional belongings brought in for patient, items gone over by Alexi Young with the pt present      Belongings at bedside:  Sweatshirt  Sweat pants :3  Long sleeve ori:3  Shirt  Shoes    Belongings sent to storage:  Book/journal  misc snacks

## 2022-09-20 NOTE — PROGRESS NOTES
Progress Note - Anselmo Fair 46 y o  female MRN: [id number]    Unit/Bed#: [facility] 224-02 Encounter: [id number]        Subjective:   Patient seen and examined at bedside after reviewing the chart and discussing the case with the caring staff  Patient examined at bedside  Patient has no acute concerns  Patient's wound culture is negative for infection  Patient is planned for discharge this Thursday 09/22/2022  Patient is requesting all her prescriptions  I reviewed and reconciled patient's problem list and medications  Physical Exam   Vitals: Blood pressure 111/62, pulse 76, temperature 97 8 °F (36 6 °C), temperature source Temporal, resp  rate 16, height 5' 5" (1 651 m), weight 79 kg (174 lb 3 2 oz), SpO2 99 %  ,Body mass index is 28 99 kg/m²  Constitutional: Patient in no acute distress  HEENT: PERR, EOMI, MMM  Cardiovascular: Normal rate and regular rhythm  Pulmonary/Chest: Effort normal and breath sounds normal    Abdomen: Non distended  Right breast   Purulent discharge from lateral side of the nipple/areola with moderate tenderness  No axillary lymphadenopathy and no lump found on bilateral breast exam    Assessment/Plan:  Anselmo Fair is a(n) 46 y o  female with bipolar disorder  MEDICAL CLEARANCE  Patient is medically cleared for discharge  All scripts will be sent out for the patient [address]      1  Migraines  Patient is on Topamax 25 mg twice daily  2  Arthralgias  Patient may take Tylenol as needed  3  GERD  Stable  Mylanta as needed  4  Psoriasis  Stable  5  Asthma  Patient may use albuterol inhaler as needed  6  Vitamin D deficiency  Patient started on vitamin D3 1000 units daily  7  Right breast abscess/mastitis  I will put the patient on Bactrim DS for 10 days  Patient's wound culture is negative for any evidence of infection

## 2022-09-20 NOTE — SOCIAL WORK
Sw met with pt for check in  Pt presents tearful but alert  Pt endorses SI, AH commanding her to bang her head against wall, kill herself, leave unit, go out partying  Pt stated bf Mitchel Mutter called last night antagonizing her over alleged picture found on pt's found  Pt states bf will often try to trigger her to get a reaction  Pt requested Meng Estrada to be rescinded  Nursing staff notified

## 2022-09-21 LAB
BACTERIA WND AEROBE CULT: NO GROWTH
GRAM STN SPEC: NORMAL
GRAM STN SPEC: NORMAL

## 2022-09-21 PROCEDURE — 99232 SBSQ HOSP IP/OBS MODERATE 35: CPT | Performed by: HOSPITALIST

## 2022-09-21 RX ORDER — POLYETHYLENE GLYCOL 3350 17 G/17G
17 POWDER, FOR SOLUTION ORAL ONCE
Status: COMPLETED | OUTPATIENT
Start: 2022-09-21 | End: 2022-09-21

## 2022-09-21 RX ADMIN — HYDROXYZINE HYDROCHLORIDE 100 MG: 50 TABLET, FILM COATED ORAL at 20:24

## 2022-09-21 RX ADMIN — SULFAMETHOXAZOLE AND TRIMETHOPRIM 1 TABLET: 800; 160 TABLET ORAL at 21:16

## 2022-09-21 RX ADMIN — TOPIRAMATE 25 MG: 25 TABLET, FILM COATED ORAL at 08:41

## 2022-09-21 RX ADMIN — BENZTROPINE MESYLATE 1 MG: 1 TABLET ORAL at 17:29

## 2022-09-21 RX ADMIN — SULFAMETHOXAZOLE AND TRIMETHOPRIM 1 TABLET: 800; 160 TABLET ORAL at 08:41

## 2022-09-21 RX ADMIN — OLANZAPINE 5 MG: 5 TABLET, ORALLY DISINTEGRATING ORAL at 21:16

## 2022-09-21 RX ADMIN — PALIPERIDONE 9 MG: 6 TABLET, EXTENDED RELEASE ORAL at 08:41

## 2022-09-21 RX ADMIN — POLYETHYLENE GLYCOL 3350 17 G: 17 POWDER, FOR SOLUTION ORAL at 15:52

## 2022-09-21 RX ADMIN — BENZTROPINE MESYLATE 1 MG: 1 TABLET ORAL at 08:41

## 2022-09-21 RX ADMIN — TOPIRAMATE 25 MG: 25 TABLET, FILM COATED ORAL at 17:28

## 2022-09-21 RX ADMIN — HYDROXYZINE HYDROCHLORIDE 50 MG: 50 TABLET, FILM COATED ORAL at 09:50

## 2022-09-21 RX ADMIN — CLONIDINE HYDROCHLORIDE 0.1 MG: 0.1 TABLET ORAL at 21:17

## 2022-09-21 RX ADMIN — CHOLECALCIFEROL TAB 25 MCG (1000 UNIT) 1000 UNITS: 25 TAB at 08:41

## 2022-09-21 NOTE — NURSING NOTE
Patient visible on the unit with no behavioral issues  Patient remained in control of mood socializing with peers  Patient denies SI HI AVH endorses moderate anxiety and medicated with atarax 50mg at 2004 which was effective  Also medicated with senokot S at 2203  Also discussed with patient utilizing coping mechanism  Med compliant  Q 7 min safety checks maintained  Pt rec'd Invega 234mg on 9/20/22   Anticipated d/c Friday  Pt currently Rt on Bactrim DS for 10 days for right breast abscess wound culture negative for infection     PT SLEPT THRU NIGHT WITHOUT INTERRUPTIONS NON LABORED BREATHING NOTED

## 2022-09-21 NOTE — PROGRESS NOTES
Progress Note - [place] [address] 46 y o  female MRN: [id number]   Unit/Bed#: [facility] 224-02 Encounter: [id number]    Behavior over the last 24 hours: some improvement  Candice seen today at bedside  Per staff report, pt has been social on unit, and has shown improvement with coping skills and better ability to remain in control of her mood  She had no reported outbursts yesterday  Patient received less PRNs for anxiety last night  Patient is pleasant, calm, and cooperative this morning  She is brighter and more forthcoming  She reports feeling better and happier today after receiving her Invega injection yesterday  She reports improved sleep without racing thoughts  She reports improved mood with decreased anxiety and denies feelings of depression  She reports decreased negative thoughts  She reports 50% improvement of her auditory hallucinations stating that the voices are still commanding, but are quieter and less distracting  She gives provider drawings that she made depicting her AH  She reports decreased urges to self-harm and denies any SI/HI  She states she is still upset with her boyfriend, but plans to return home to him upon discharge  She discusses her recent methods of implementing healthier coping skills to deal with stressors  She remains compliant with medication  Pt has improving insight and judgement      Sleep: improved  Appetite: fair  Medication side effects: Constipation      Mental Status Evaluation:    Appearance:  age appropriate, casually dressed, adequate grooming, well-nourished   Behavior:  pleasant, cooperative, calm   Speech:  average rate and volume   Mood:  improved, "Better"   Affect:  brighter, more appropriate   Thought Process:  linear   Associations: intact associations   Thought Content:  no overt delusions, less negative thinking   Perceptual Disturbances: auditory hallucinations with commands still present, but less frequent, quieter and less distracting; denies visual hallucinations   Risk Potential: Suicidal ideation - Yes, passive death wish, contracts for safety on the unit, would talk to staff if not feeling safe on the unit, self abusive thoughts, self abusive behavior  Homicidal ideation - None at present, no plan   Sensorium:  oriented to person, place and time/date         Memory:  recent and remote memory grossly intact      Consciousness:  alert and awake      Attention: attention span and concentration are age appropriate      Insight:  improving      Judgment: improving      Gait/Station: normal gait/station      Motor Activity: no abnormal movements     Vital signs in last 24 hours:    Temp:  [99 2 °F (37 3 °C)] 99 2 °F (37 3 °C)  HR:  [75] 75  Resp:  [16] 16  BP: ()/(48-65) 148/65    Laboratory results:   I have personally reviewed all pertinent laboratory/tests results  Most Recent Labs:   Lab Results   Component Value Date    WBC 12 74 (H) 09/15/2022    RBC 4 87 09/15/2022    HGB 14 0 09/15/2022    HCT 44 6 09/15/2022     09/15/2022    RDW 13 7 09/15/2022    NEUTROABS 6 43 09/15/2022    SODIUM 138 09/15/2022    K 3 7 09/15/2022     09/15/2022    CO2 27 09/15/2022    BUN 13 09/15/2022    CREATININE 0 93 09/15/2022    GLUC 78 09/15/2022    GLUF 78 09/15/2022    CALCIUM 9 3 09/15/2022    AST 25 09/15/2022    ALT 8 09/15/2022    ALKPHOS 67 09/15/2022    TP 7 3 09/15/2022    ALB 4 1 09/15/2022    TBILI 0 56 09/15/2022    CHOLESTEROL 195 09/15/2022    HDL 41 (L) 09/15/2022    TRIG 87 09/15/2022    LDLCALC 137 (H) 09/15/2022    [place] 154 09/15/2022    [id number] 3 023 09/15/2022    PREGUR negative 09/11/2022         Assessment/Plan   Principal Problem:    Bipolar 1 disorder ([facility] [address] )    Recommended Treatment:     Planned medication and treatment changes:  Received [place] injection 234 mg IM initiation dose on 09/20  Will receive second initiation injection of Invega Sust[place] 156 mg IM on 9/25  Continue Invega to 9 mg daily  Continue Cogentin 1 mg BID for restlessness  Continue Zyprexa to 5 mg QHS  Monitor for improvement of psychotic symptoms      All current active medications have been reviewed  Encourage group therapy, milieu therapy and occupational therapy  Continue treatment with group therapy, milieu therapy and occupational therapy  Behavioral Health checks every 7 minutes  Current Facility-Administered Medications   Medication Dose Route Frequency Provider Last Rate    acetaminophen  650 mg Oral Q6H PRN Emilia Kalaga, DO      acetaminophen  650 mg Oral Q4H PRN Emilia Kalaga, DO      acetaminophen  975 mg Oral Q6H PRN Emilia Kalaga, DO      albuterol  2 puff Inhalation Q4H PRN Renetta Baker PA-C      aluminum-magnesium hydroxide-simethicone  30 mL Oral Q4H PRN Emilia Kalaga, DO      benztropine  1 mg Intramuscular Q4H PRN Max 6/day Juanell Me, DO      benztropine  1 mg Oral Q4H PRN Max 6/day Juanell Me, DO      benztropine  1 mg Oral BID Luis Hogue MD      cholecalciferol  1,000 Units Oral Daily Renetta Baker PA-C      cloNIDine  0 1 mg Oral HS Emilia Kalaga, DO      hydrOXYzine HCL  50 mg Oral Q6H PRN Max 4/day Emilia Kalaga, DO      Or    diphenhydrAMINE  50 mg Intramuscular Q6H PRN Emilia Kalaga, DO      haloperidol  5 mg Oral Daily PRN [facility] Hospital[facility]i Culp, CRNP      hydrOXYzine HCL  100 mg Oral Q6H PRN Max 4/day Emilia Kalaga, DO      Or    LORazepam  2 mg Intramuscular Q6H PRN Emilia Kalaga, DO      hydrOXYzine HCL  25 mg Oral Q6H PRN Max 4/day Emilia Kalaga, DO      LORazepam  0 5 mg Oral Q6H PRN Emilia Kalaga, DO      OLANZapine  5 mg Oral HS Emilia Kalaga, DO      OLANZapine  10 mg Oral Q3H PRN Max 3/day Emilia Kalaga, DO      Or    OLANZapine  10 mg Intramuscular Q3H PRN Max 3/day Emilia Kalaga, DO      OLANZapine  5 mg Oral Q3H PRN Max 6/day Emilia Kalaga, DO      Or    OLANZapine  5 mg Intramuscular Q3H PRN Max 6/day Emilia Kalaga, DO      OLANZapine  2 5 mg Oral Q3H PRN Max 8/day Gabrielle Ley DO      paliperidone  9 mg Oral Daily Bharti Sloan MD      [START ON 9/25/2022] paliperidone palmitate ER  156 mg Intramuscular Once Gabrielle Ley DO      polyethylene glycol  17 g Oral Daily PRN Gabrielle Ley DO      senna-docusate sodium  1 tablet Oral Daily PRN Gabrielle Ley DO      sulfamethoxazole-trimethoprim  1 tablet Oral Q12H [address] Wei Salcedo MD      topiramate  25 mg Oral BID Delvin Hauser MD      traZODone  50 mg Oral HS PRN Gabrielle Ley DO         Risks / Benefits of Treatment:    Risks, benefits, and possible side effects of medications explained to patient and patient verbalizes understanding and agreement for treatment  Counseling / Coordination of Care: Total floor / unit time spent today 25 minutes  Greater than 50% of total time was spent with the patient and / or family counseling and / or coordination of care  A description of counseling / coordination of care:  Patient's progress discussed with staff in treatment team meeting  Medications, treatment progress and treatment plan reviewed with patient      Gabrielle Ley DO 09/21/22

## 2022-09-21 NOTE — PROGRESS NOTES
09/21/22 0730   Activity/Group Checklist   Group   (Patient morning check in with Covid Precautions)   Attendance Attended   Attendance Duration (min) 31-45   Interactions Interacted appropriately   Affect/Mood Appropriate;Calm   Goals Achieved Identified feelings; Discussed coping strategies; Able to listen to others; Able to engage in interactions; Able to reflect/comment on own behavior;Able to manage/cope with feelings

## 2022-09-21 NOTE — NURSING NOTE
Presents with bright affect upon approach,rates anxiety and depression as mild to moderate and controlled  She denies SI,HI,VH,endorses AH,non command in nature and less frequency  She attends groups with appropriate and active participation  She is supportive of peers  We discussed ways to increase coping skills  Will continue to educate,monitor,and provide safe,therapeutic milieu

## 2022-09-21 NOTE — PLAN OF CARE
Problem: Depression  Goal: Refrain from isolation  Description: Interventions:  - Develop a trusting relationship   - Encourage socialization   Outcome: Progressing  Goal: Refrain from self-neglect  Outcome: Progressing     Problem: Anxiety  Goal: Anxiety is at manageable level  Description: Interventions:  - Assess and monitor patient's anxiety level  - Monitor for signs and symptoms (heart palpitations, chest pain, shortness of breath, headaches, nausea, feeling jumpy, restlessness, irritable, apprehensive)  - Collaborate with interdisciplinary team and initiate plan and interventions as ordered    - [place] patient to unit/surroundings  - Explain treatment plan  - Encourage participation in care  - Encourage verbalization of concerns/fears  - Identify coping mechanisms  - Assist in developing anxiety-reducing skills  - Administer/offer alternative therapies  - Limit or eliminate stimulants  Outcome: Progressing

## 2022-09-21 NOTE — PROGRESS NOTES
Progress Note - Xochilt Jerome 46 y o  female MRN: [id number]    Unit/Bed#: [facility] 224-02 Encounter: [id number]        Subjective:   Patient seen and examined at bedside after reviewing the chart and discussing the case with the caring staff  Patient examined at bedside  Patient has no acute concerns  Patient is planned for discharge this Thursday 09/22/2022  Patient is requesting all her prescriptions  I reviewed and reconciled patient's problem list and medications  Physical Exam   Vitals: Blood pressure 148/65, pulse 75, temperature 99 2 °F (37 3 °C), temperature source Temporal, resp  rate 16, height 5' 5" (1 651 m), weight 79 kg (174 lb 3 2 oz), SpO2 98 %  ,Body mass index is 28 99 kg/m²  Constitutional: Patient in no acute distress  HEENT: PERR, EOMI, MMM  Cardiovascular: Normal rate and regular rhythm  Pulmonary/Chest: Effort normal and breath sounds normal    Abdomen: Non distended  Right breast   Purulent discharge from lateral side of the nipple/areola with moderate tenderness  No axillary lymphadenopathy and no lump found on bilateral breast exam    Assessment/Plan:  Xochilt Jerome is a(n) 46 y o  female with bipolar disorder  MEDICAL CLEARANCE  Patient is medically cleared for discharge  All scripts will be sent out for the patient [address]      1  Migraines  Patient is on Topamax 25 mg twice daily  2  Arthralgias  Patient may take Tylenol as needed  3  GERD  Stable  Mylanta as needed  4  Psoriasis  Stable  5  Asthma  Patient may use albuterol inhaler as needed  6  Vitamin D deficiency  Patient started on vitamin D3 1000 units daily  7  Right breast abscess/mastitis  I will put the patient on Bactrim DS for 10 days  Patient's wound culture is negative for any evidence of infection

## 2022-09-21 NOTE — PROGRESS NOTES
09/21/22 1030   Activity/Group Checklist   Group   (Happiness Trigger Self Reflection Art Therapy)   Attendance Attended   Attendance Duration (min) Greater than 60   Interactions Interacted appropriately   Affect/Mood Appropriate;Bright;Calm   Goals Achieved Identified feelings; Identified triggers; Discussed coping strategies; Identified resources and support systems; Able to listen to others; Able to engage in interactions; Able to reflect/comment on own behavior;Able to manage/cope with feelings

## 2022-09-21 NOTE — PLAN OF CARE
Problem: Alteration in Thoughts and Perception  Goal: Verbalize thoughts and feelings  Description: Interventions:  - Promote a nonjudgmental and trusting relationship with the patient through active listening and therapeutic communication  - Assess patient's level of functioning, behavior and potential for risk  - Engage patient in 1 on 1 interactions  - Encourage patient to express fears, feelings, frustrations, and discuss symptoms    - [place] patient to reality, help patient recognize reality-based thinking   - Administer medications as ordered and assess for potential side effects  - Provide the patient education related to the signs and symptoms of the illness and desired effects of prescribed medications  Outcome: Progressing  Goal: Agree to be compliant with medication regime, as prescribed and report medication side effects  Description: Interventions:  - Offer appropriate PRN medication and supervise ingestion; conduct AIMS, as needed   Outcome: Progressing  Goal: Attend and participate in unit activities, including therapeutic, recreational, and educational groups  Description: Interventions:  -Encourage Visitation and family involvement in care  Outcome: Progressing  Goal: Recognize dysfunctional thoughts, communicate reality-based thoughts at the time of discharge  Description: Interventions:  - Provide medication and psycho-education to assist patient in compliance and developing insight into his/her illness   Outcome: Progressing     Problem: Ineffective Coping  Goal: Identifies ineffective coping skills  Outcome: Progressing  Goal: Identifies healthy coping skills  Outcome: Progressing  Goal: Demonstrates healthy coping skills  Outcome: Progressing     Problem: Risk for Self Injury/Neglect  Goal: Treatment Goal: Remain safe during length of stay, learn and adopt new coping skills, and be free of self-injurious ideation, impulses and acts at the time of discharge  Outcome: Progressing  Goal: Refrain from harming self  Description: Interventions:  - Monitor patient closely, per order  - Develop a trusting relationship  - Supervise medication ingestion, monitor effects and side effects   Outcome: Progressing  Goal: Recognize maladaptive responses and adopt new coping mechanisms  Outcome: Progressing  Goal: Complete daily ADLs, including personal hygiene independently, as able  Description: Interventions:  - Observe, teach, and assist patient with ADLS  - Monitor and promote a balance of rest/activity, with adequate nutrition and elimination  Outcome: Progressing     Problem: Depression  Goal: Refrain from isolation  Description: Interventions:  - Develop a trusting relationship   - Encourage socialization   Outcome: Progressing  Goal: Refrain from self-neglect  Outcome: Progressing     Problem: Anxiety  Goal: Anxiety is at manageable level  Description: Interventions:  - Assess and monitor patient's anxiety level  - Monitor for signs and symptoms (heart palpitations, chest pain, shortness of breath, headaches, nausea, feeling jumpy, restlessness, irritable, apprehensive)  - Collaborate with interdisciplinary team and initiate plan and interventions as ordered    - [place] patient to unit/surroundings  - Explain treatment plan  - Encourage participation in care  - Encourage verbalization of concerns/fears  - Identify coping mechanisms  - Assist in developing anxiety-reducing skills  - Administer/offer alternative therapies  - Limit or eliminate stimulants  Outcome: Progressing     Problem: Ineffective Coping  Goal: Participates in unit activities  Description: Interventions:  - Provide therapeutic environment   - Provide required programming   - Redirect inappropriate behaviors   Outcome: Progressing     Problem: DISCHARGE PLANNING - CARE MANAGEMENT  Goal: Discharge to post-acute care or home with appropriate resources  Description: INTERVENTIONS:  - Conduct assessment to determine patient/family and health care team treatment goals, and need for post-acute services based on payer coverage, community resources, and patient preferences, and barriers to discharge  - Address psychosocial, clinical, and financial barriers to discharge as identified in assessment in conjunction with the patient/family and health care team  - Arrange appropriate level of post-acute services according to patients   needs and preference and payer coverage in collaboration with the physician and health care team  - Communicate with and update the patient/family, physician, and health care team regarding progress on the discharge plan  - Arrange appropriate transportation to post-acute venues  Outcome: Progressing

## 2022-09-21 NOTE — PLAN OF CARE
Problem: Alteration in Thoughts and Perception  Goal: Verbalize thoughts and feelings  Description: Interventions:  - Promote a nonjudgmental and trusting relationship with the patient through active listening and therapeutic communication  - Assess patient's level of functioning, behavior and potential for risk  - Engage patient in 1 on 1 interactions  - Encourage patient to express fears, feelings, frustrations, and discuss symptoms    - [place] patient to reality, help patient recognize reality-based thinking   - Administer medications as ordered and assess for potential side effects  - Provide the patient education related to the signs and symptoms of the illness and desired effects of prescribed medications  Outcome: Progressing  Goal: Agree to be compliant with medication regime, as prescribed and report medication side effects  Description: Interventions:  - Offer appropriate PRN medication and supervise ingestion; conduct AIMS, as needed   Outcome: Progressing  Goal: Attend and participate in unit activities, including therapeutic, recreational, and educational groups  Description: Interventions:  -Encourage Visitation and family involvement in care  Outcome: Progressing  Goal: Recognize dysfunctional thoughts, communicate reality-based thoughts at the time of discharge  Description: Interventions:  - Provide medication and psycho-education to assist patient in compliance and developing insight into his/her illness   Outcome: Progressing     Problem: Ineffective Coping  Goal: Identifies ineffective coping skills  Outcome: Progressing  Goal: Identifies healthy coping skills  Outcome: Progressing  Goal: Demonstrates healthy coping skills  Outcome: Progressing     Problem: Risk for Self Injury/Neglect  Goal: Treatment Goal: Remain safe during length of stay, learn and adopt new coping skills, and be free of self-injurious ideation, impulses and acts at the time of discharge  Outcome: Progressing  Goal: Refrain from harming self  Description: Interventions:  - Monitor patient closely, per order  - Develop a trusting relationship  - Supervise medication ingestion, monitor effects and side effects   Outcome: Progressing  Goal: Recognize maladaptive responses and adopt new coping mechanisms  Outcome: Progressing  Goal: Complete daily ADLs, including personal hygiene independently, as able  Description: Interventions:  - Observe, teach, and assist patient with ADLS  - Monitor and promote a balance of rest/activity, with adequate nutrition and elimination  Outcome: Progressing     Problem: Risk for Self Injury/Neglect  Goal: Treatment Goal: Remain safe during length of stay, learn and adopt new coping skills, and be free of self-injurious ideation, impulses and acts at the time of discharge  Outcome: Progressing  Goal: Refrain from harming self  Description: Interventions:  - Monitor patient closely, per order  - Develop a trusting relationship  - Supervise medication ingestion, monitor effects and side effects   Outcome: Progressing  Goal: Recognize maladaptive responses and adopt new coping mechanisms  Outcome: Progressing  Goal: Complete daily ADLs, including personal hygiene independently, as able  Description: Interventions:  - Observe, teach, and assist patient with ADLS  - Monitor and promote a balance of rest/activity, with adequate nutrition and elimination  Outcome: Progressing     Problem: Depression  Goal: Refrain from isolation  Description: Interventions:  - Develop a trusting relationship   - Encourage socialization   Outcome: Progressing  Goal: Refrain from self-neglect  Outcome: Progressing     Problem: Anxiety  Goal: Anxiety is at manageable level  Description: Interventions:  - Assess and monitor patient's anxiety level  - Monitor for signs and symptoms (heart palpitations, chest pain, shortness of breath, headaches, nausea, feeling jumpy, restlessness, irritable, apprehensive)     - Collaborate with interdisciplinary team and initiate plan and interventions as ordered    - [place] patient to unit/surroundings  - Explain treatment plan  - Encourage participation in care  - Encourage verbalization of concerns/fears  - Identify coping mechanisms  - Assist in developing anxiety-reducing skills  - Administer/offer alternative therapies  - Limit or eliminate stimulants  Outcome: Progressing     Problem: Ineffective Coping  Goal: Participates in unit activities  Description: Interventions:  - Provide therapeutic environment   - Provide required programming   - Redirect inappropriate behaviors   Outcome: Progressing     Problem: DISCHARGE PLANNING - CARE MANAGEMENT  Goal: Discharge to post-acute care or home with appropriate resources  Description: INTERVENTIONS:  - Conduct assessment to determine patient/family and health care team treatment goals, and need for post-acute services based on payer coverage, community resources, and patient preferences, and barriers to discharge  - Address psychosocial, clinical, and financial barriers to discharge as identified in assessment in conjunction with the patient/family and health care team  - Arrange appropriate level of post-acute services according to patients   needs and preference and payer coverage in collaboration with the physician and health care team  - Communicate with and update the patient/family, physician, and health care team regarding progress on the discharge plan  - Arrange appropriate transportation to post-acute venues  Outcome: Progressing

## 2022-09-21 NOTE — PROGRESS NOTES
09/21/22    Team Meeting   Meeting Type Daily Rounds   Team Members Present   Team Members Present Physician;Nurse;   Physician Team Member Dr Cherise Jensen MD; Dr John Paul Dailey, DO; HOSP DR ABDIEL ADAMS, [address] [place] [address]   Nursing Team Member Corinna Marquez RN   Social Work Team Member Oral Spar, [place]   Patient/Family Present   Patient Present No   Patient's Family Present No     Calm, cooperative, endorses AH, improving, more controlled, frequent PRNs, irritable, denies all symptoms except anx, [place] next due Sunday

## 2022-09-21 NOTE — NURSING NOTE
Patient visible on unit   Alert and oriented  Pleasant and cooperative  Administer atarax 50 mg for anxiety at 0950  Hallucination controlled , Denies depression, HI, SI  Schedule PO medication administered as ordered  Appetite good  Continue on safety check

## 2022-09-21 NOTE — PLAN OF CARE
Problem: Alteration in Thoughts and Perception  Goal: Verbalize thoughts and feelings  Description: Interventions:  - Promote a nonjudgmental and trusting relationship with the patient through active listening and therapeutic communication  - Assess patient's level of functioning, behavior and potential for risk  - Engage patient in 1 on 1 interactions  - Encourage patient to express fears, feelings, frustrations, and discuss symptoms    - [place] patient to reality, help patient recognize reality-based thinking   - Administer medications as ordered and assess for potential side effects  - Provide the patient education related to the signs and symptoms of the illness and desired effects of prescribed medications  9/20/2022 2204 by Yeimy De Santiago RN  Outcome: Progressing  9/20/2022 2157 by Yeimy De Santiago RN  Outcome: Progressing  Goal: Agree to be compliant with medication regime, as prescribed and report medication side effects  Description: Interventions:  - Offer appropriate PRN medication and supervise ingestion; conduct AIMS, as needed   9/20/2022 2204 by Yeimy De Santiago RN  Outcome: Progressing  9/20/2022 2157 by Yeimy De Santiago RN  Outcome: Progressing  Goal: Attend and participate in unit activities, including therapeutic, recreational, and educational groups  Description: Interventions:  -Encourage Visitation and family involvement in care  9/20/2022 2204 by Yeimy De Santiago RN  Outcome: Progressing  9/20/2022 2157 by Yeimy De Santiago RN  Outcome: Progressing  Goal: Recognize dysfunctional thoughts, communicate reality-based thoughts at the time of discharge  Description: Interventions:  - Provide medication and psycho-education to assist patient in compliance and developing insight into his/her illness   9/20/2022 2204 by Yeiym De Santiago RN  Outcome: Progressing  9/20/2022 2157 by Yeimy De Santiago RN  Outcome: Progressing     Problem: Ineffective Coping  Goal: Identifies ineffective coping skills  9/20/2022 2204 by Carlos Alberto Andersen RN  Outcome: Progressing  9/20/2022 2157 by Carlos Alberto Andersen RN  Outcome: Progressing  Goal: Identifies healthy coping skills  9/20/2022 2204 by Carlos Alberto Andersen RN  Outcome: Progressing  9/20/2022 2157 by Carlos Alberto Andersen RN  Outcome: Progressing  Goal: Demonstrates healthy coping skills  9/20/2022 2204 by Carlos Alberto Andersen RN  Outcome: Progressing  9/20/2022 2157 by Carlos Alberto Andersen RN  Outcome: Progressing     Problem: Risk for Self Injury/Neglect  Goal: Treatment Goal: Remain safe during length of stay, learn and adopt new coping skills, and be free of self-injurious ideation, impulses and acts at the time of discharge  9/20/2022 2204 by Carlos Alberto Andersen RN  Outcome: Progressing  9/20/2022 2157 by Carlos Alberto Andersen RN  Outcome: Progressing  Goal: Refrain from harming self  Description: Interventions:  - Monitor patient closely, per order  - Develop a trusting relationship  - Supervise medication ingestion, monitor effects and side effects   9/20/2022 2204 by Carlos Alberto Andersen RN  Outcome: Progressing  9/20/2022 2157 by Carlos Alberto Andersen RN  Outcome: Progressing  Goal: Recognize maladaptive responses and adopt new coping mechanisms  9/20/2022 2204 by Carlos Alberto Andersen RN  Outcome: Progressing  9/20/2022 2157 by Carlos Alberto Andersen RN  Outcome: Progressing  Goal: Complete daily ADLs, including personal hygiene independently, as able  Description: Interventions:  - Observe, teach, and assist patient with ADLS  - Monitor and promote a balance of rest/activity, with adequate nutrition and elimination  9/20/2022 2204 by Carlos Alberto Andersen RN  Outcome: Progressing  9/20/2022 2157 by Carlos Alberto Andersen RN  Outcome: Progressing     Problem: Depression  Goal: Refrain from isolation  Description: Interventions:  - Develop a trusting relationship   - Encourage socialization   9/20/2022 2204 by Carlos Alberto Andersen RN  Outcome: Progressing  9/20/2022 2157 by Carlos Alberto Andersen RN  Outcome: Progressing  Goal: Refrain from self-neglect  9/20/2022 2204 by Gideon Green RN  Outcome: Progressing  9/20/2022 2157 by Gideon Green RN  Outcome: Progressing     Problem: Anxiety  Goal: Anxiety is at manageable level  Description: Interventions:  - Assess and monitor patient's anxiety level  - Monitor for signs and symptoms (heart palpitations, chest pain, shortness of breath, headaches, nausea, feeling jumpy, restlessness, irritable, apprehensive)  - Collaborate with interdisciplinary team and initiate plan and interventions as ordered    - [place] patient to unit/surroundings  - Explain treatment plan  - Encourage participation in care  - Encourage verbalization of concerns/fears  - Identify coping mechanisms  - Assist in developing anxiety-reducing skills  - Administer/offer alternative therapies  - Limit or eliminate stimulants  9/20/2022 2204 by Gideon Green RN  Outcome: Progressing  9/20/2022 2157 by Gideon Green RN  Outcome: Progressing     Problem: Ineffective Coping  Goal: Participates in unit activities  Description: Interventions:  - Provide therapeutic environment   - Provide required programming   - Redirect inappropriate behaviors   9/20/2022 2204 by Gideon Green RN  Outcome: Progressing  9/20/2022 2157 by Gideon Green RN  Outcome: Progressing     Problem: DISCHARGE PLANNING - CARE MANAGEMENT  Goal: Discharge to post-acute care or home with appropriate resources  Description: INTERVENTIONS:  - Conduct assessment to determine patient/family and health care team treatment goals, and need for post-acute services based on payer coverage, community resources, and patient preferences, and barriers to discharge  - Address psychosocial, clinical, and financial barriers to discharge as identified in assessment in conjunction with the patient/family and health care team  - Arrange appropriate level of post-acute services according to patients   needs and preference and payer coverage in collaboration with the physician and health care team  - Communicate with and update the patient/family, physician, and health care team regarding progress on the discharge plan  - Arrange appropriate transportation to post-acute venues  9/20/2022 2204 by Yeimy De Santiago RN  Outcome: Progressing  9/20/2022 2157 by Yeimy De Santiago RN  Outcome: Progressing     Problem: DISCHARGE PLANNING - CARE MANAGEMENT  Goal: Discharge to post-acute care or home with appropriate resources  Description: INTERVENTIONS:  - Conduct assessment to determine patient/family and health care team treatment goals, and need for post-acute services based on payer coverage, community resources, and patient preferences, and barriers to discharge  - Address psychosocial, clinical, and financial barriers to discharge as identified in assessment in conjunction with the patient/family and health care team  - Arrange appropriate level of post-acute services according to patients   needs and preference and payer coverage in collaboration with the physician and health care team  - Communicate with and update the patient/family, physician, and health care team regarding progress on the discharge plan  - Arrange appropriate transportation to post-acute venues  9/20/2022 2204 by Yeimy De Santiago RN  Outcome: Progressing  9/20/2022 2157 by Yeimy De Santiago RN  Outcome: Progressing

## 2022-09-22 PROCEDURE — 99232 SBSQ HOSP IP/OBS MODERATE 35: CPT | Performed by: PSYCHIATRY & NEUROLOGY

## 2022-09-22 RX ORDER — PRAZOSIN HYDROCHLORIDE 1 MG/1
1 CAPSULE ORAL
Status: DISCONTINUED | OUTPATIENT
Start: 2022-09-22 | End: 2022-09-22

## 2022-09-22 RX ORDER — PRAZOSIN HYDROCHLORIDE 1 MG/1
1 CAPSULE ORAL 2 TIMES DAILY
Status: DISCONTINUED | OUTPATIENT
Start: 2022-09-22 | End: 2022-09-27 | Stop reason: HOSPADM

## 2022-09-22 RX ORDER — POLYETHYLENE GLYCOL 3350 17 G/17G
17 POWDER, FOR SOLUTION ORAL ONCE
Status: COMPLETED | OUTPATIENT
Start: 2022-09-22 | End: 2022-09-22

## 2022-09-22 RX ORDER — DOCUSATE SODIUM 100 MG/1
100 CAPSULE, LIQUID FILLED ORAL DAILY
Status: DISCONTINUED | OUTPATIENT
Start: 2022-09-22 | End: 2022-09-26

## 2022-09-22 RX ADMIN — SULFAMETHOXAZOLE AND TRIMETHOPRIM 1 TABLET: 800; 160 TABLET ORAL at 21:20

## 2022-09-22 RX ADMIN — BENZTROPINE MESYLATE 1 MG: 1 TABLET ORAL at 17:50

## 2022-09-22 RX ADMIN — LORAZEPAM 0.5 MG: 0.5 TABLET ORAL at 10:33

## 2022-09-22 RX ADMIN — POLYETHYLENE GLYCOL 3350 17 G: 17 POWDER, FOR SOLUTION ORAL at 11:43

## 2022-09-22 RX ADMIN — TOPIRAMATE 25 MG: 25 TABLET, FILM COATED ORAL at 08:10

## 2022-09-22 RX ADMIN — BENZTROPINE MESYLATE 1 MG: 1 TABLET ORAL at 08:10

## 2022-09-22 RX ADMIN — PALIPERIDONE 9 MG: 6 TABLET, EXTENDED RELEASE ORAL at 08:10

## 2022-09-22 RX ADMIN — CHOLECALCIFEROL TAB 25 MCG (1000 UNIT) 1000 UNITS: 25 TAB at 08:10

## 2022-09-22 RX ADMIN — OLANZAPINE 5 MG: 5 TABLET, ORALLY DISINTEGRATING ORAL at 21:20

## 2022-09-22 RX ADMIN — PRAZOSIN HYDROCHLORIDE 1 MG: 1 CAPSULE ORAL at 17:49

## 2022-09-22 RX ADMIN — DOCUSATE SODIUM 100 MG: 100 CAPSULE, LIQUID FILLED ORAL at 11:43

## 2022-09-22 RX ADMIN — TOPIRAMATE 25 MG: 25 TABLET, FILM COATED ORAL at 17:50

## 2022-09-22 RX ADMIN — SULFAMETHOXAZOLE AND TRIMETHOPRIM 1 TABLET: 800; 160 TABLET ORAL at 08:10

## 2022-09-22 RX ADMIN — PRAZOSIN HYDROCHLORIDE 1 MG: 1 CAPSULE ORAL at 14:14

## 2022-09-22 NOTE — NURSING NOTE
Patient stated 6 days without BM after having medication for constipation  Positive bound sounds present  Placed on MD list  Encouraged increase intake of water

## 2022-09-22 NOTE — CASE MANAGEMENT
Worker contacted [facility] regarding outpatient appointments, they are only accepting pt's with urgent needs, given that pt is in the hospital, she does meet the need for outpatient psychiatry  Worker provided demographics and diagnosis, Cecilio Crowell at [facility] will send information for review and get back to worker regarding intake appointment

## 2022-09-22 NOTE — PLAN OF CARE
Problem: DISCHARGE PLANNING - CARE MANAGEMENT  Goal: Discharge to post-acute care or home with appropriate resources  Description: INTERVENTIONS:  - Conduct assessment to determine patient/family and health care team treatment goals, and need for post-acute services based on payer coverage, community resources, and patient preferences, and barriers to discharge  - Address psychosocial, clinical, and financial barriers to discharge as identified in assessment in conjunction with the patient/family and health care team  - Arrange appropriate level of post-acute services according to patients   needs and preference and payer coverage in collaboration with the physician and health care team  - Communicate with and update the patient/family, physician, and health care team regarding progress on the discharge plan  - Arrange appropriate transportation to post-acute venues  Outcome: Progressing     Pt progressing, d/c Monday with OP follow up

## 2022-09-22 NOTE — DISCHARGE INSTR - OTHER ORDERS
You are being discharged to your home located at [address]; phone: [phone] (M)    Triggers you have identified during your hospitalization that led to your admission of a distressed mood include substance use and relationship stressors  Coping skills you have identified during your hospitalization include spending time with family and being social  If you are unable to deal with your distressed mood alone please contact Keisham Lefort (boyfriend) at [phone]  If that is not effective and you continue to have a  distressed mood, are overwhelmed, or in crisis, please contact (Crisis #) [phone], dial 913 or go to the nearest emergency center  [facility] Hotline: P ISAAK  [address] Suicide Prevention Lifeline:  1-257.938.4346  *Alcohol Anonymous: 262.306.4029  *[place] [employer] Alcohol Commission:  [address] [facility]  on Mental Illness ([place]) [place]: 973.851.1890/Website: www yashira org  *Substance Abuse and Mental Health Services Administration(SAMHSA) [employer], which is a confidential, free, 24-hour-a-day, 365-day-a-year, information service for individuals and family members facing mental health and/or substance use disorders  This service provides referrals to local treatment facilities, support groups, and community-based organizations  Callers can also order free publications and other information  Call 1-309.719.9130/Website: www [facility] gov  *[facility] 2-1-1: This is a toll free, confidential, 24-hour-a-day service which connects you to a community  in your area who can help you find services and resources that are available to you locally and provide critical services that can improve and save lives  Call: 211  /Website: https://kevinIdeedocklorenzo morris/    Michael Marie or Shantell, our [address] [place] [address], will be calling you after your discharge, on the phone number that you provided    They will be available as an additional support, if needed  If you wish to speak with one of them, you may contact Zeina Hancock at [phone] or Huy Mann at [phone]

## 2022-09-22 NOTE — PROGRESS NOTES
09/22/22 0900   Activity/Group Checklist   Group   (Patient morning check in with Covid Precautions)   Attendance Attended   Attendance Duration (min) 31-45   Interactions Interacted appropriately   Affect/Mood Appropriate   Goals Achieved Identified feelings; Discussed coping strategies; Able to listen to others; Able to engage in interactions
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
## 2022-09-22 NOTE — NURSING NOTE
Pt requested something for anxiety this writer suggested patient to try using coping skills which pt did attempt but stated it was not effective and pt was visibly shaking and crying  gave pt 0 5mg of ativan for velasco of 29 will monitor for effectiveness

## 2022-09-22 NOTE — NURSING NOTE
Pt compliant with meds and meals  Denies everything  Social and visible on unit  Pleasant and cooperative  Q 7 min behavioral and safety checks in place

## 2022-09-22 NOTE — PROGRESS NOTES
09/22/22   Team Meeting   Meeting Type Daily Rounds   Team Members Present   Team Members Present Physician;Nurse;   Physician Team Member Dr Paulo Street MD; Dr Yfn Cates DO; Demetrio Garibay PA-C   Nursing Team Member Jana Guevara, PATRICA   Social Work Team Member Majo Harman, [place]   Patient/Family Present   Patient Present No   Patient's Family Present No     D/c Monday, denies command Ah, calm, cooperative, pleasant, med comp, less VH shadows, constipated, endorses nightmares, restless

## 2022-09-22 NOTE — NURSING NOTE
Visible on unit without behavioral concerns Mood remains pleasant and cooperative  Patient denies SI HI AVh mild depression and  Severe anxiety medicated atarax 100mg PO 2024 which awas effective  Remains social with peers  Patient second dose of Invega IM due Sunday and then anticipated d/c Monday   Q 7 min safety checks maintained   Slept thru night non labored breathing noted

## 2022-09-22 NOTE — PROGRESS NOTES
09/22/22 1400   Activity/Group Checklist   Group   (Pet Therapy Along with Art Therapy Processing of Feelings and Emotions)   Attendance Attended   Attendance Duration (min) 31-45   Interactions Interacted appropriately   Affect/Mood Appropriate;Bright;Calm   Goals Achieved Identified feelings; Discussed coping strategies; Discussed discharge plans; Able to listen to others; Able to engage in interactions; Able to reflect/comment on own behavior

## 2022-09-23 PROCEDURE — 99232 SBSQ HOSP IP/OBS MODERATE 35: CPT | Performed by: HOSPITALIST

## 2022-09-23 RX ORDER — SORBITOL SOLUTION 70 %
30 SOLUTION, ORAL MISCELLANEOUS
Status: DISCONTINUED | OUTPATIENT
Start: 2022-09-23 | End: 2022-09-25

## 2022-09-23 RX ORDER — BUSPIRONE HYDROCHLORIDE 5 MG/1
5 TABLET ORAL 2 TIMES DAILY
Status: DISCONTINUED | OUTPATIENT
Start: 2022-09-23 | End: 2022-09-24

## 2022-09-23 RX ADMIN — BENZTROPINE MESYLATE 1 MG: 1 TABLET ORAL at 08:54

## 2022-09-23 RX ADMIN — HYDROXYZINE HYDROCHLORIDE 100 MG: 50 TABLET, FILM COATED ORAL at 12:49

## 2022-09-23 RX ADMIN — PRAZOSIN HYDROCHLORIDE 1 MG: 1 CAPSULE ORAL at 17:04

## 2022-09-23 RX ADMIN — BUSPIRONE HYDROCHLORIDE 5 MG: 5 TABLET ORAL at 09:13

## 2022-09-23 RX ADMIN — SULFAMETHOXAZOLE AND TRIMETHOPRIM 1 TABLET: 800; 160 TABLET ORAL at 08:53

## 2022-09-23 RX ADMIN — BENZTROPINE MESYLATE 1 MG: 1 TABLET ORAL at 17:04

## 2022-09-23 RX ADMIN — TOPIRAMATE 25 MG: 25 TABLET, FILM COATED ORAL at 08:53

## 2022-09-23 RX ADMIN — SORBITOL SOLUTION (BULK) 30 ML: 70 SOLUTION at 13:15

## 2022-09-23 RX ADMIN — DOCUSATE SODIUM 100 MG: 100 CAPSULE, LIQUID FILLED ORAL at 08:54

## 2022-09-23 RX ADMIN — OLANZAPINE 5 MG: 5 TABLET, ORALLY DISINTEGRATING ORAL at 20:33

## 2022-09-23 RX ADMIN — ALUMINUM HYDROXIDE, MAGNESIUM HYDROXIDE, AND SIMETHICONE 30 ML: 200; 200; 20 SUSPENSION ORAL at 19:59

## 2022-09-23 RX ADMIN — PALIPERIDONE 9 MG: 6 TABLET, EXTENDED RELEASE ORAL at 08:53

## 2022-09-23 RX ADMIN — CHOLECALCIFEROL TAB 25 MCG (1000 UNIT) 1000 UNITS: 25 TAB at 08:53

## 2022-09-23 RX ADMIN — TOPIRAMATE 25 MG: 25 TABLET, FILM COATED ORAL at 17:04

## 2022-09-23 RX ADMIN — HYDROXYZINE HYDROCHLORIDE 100 MG: 50 TABLET, FILM COATED ORAL at 20:13

## 2022-09-23 RX ADMIN — BUSPIRONE HYDROCHLORIDE 5 MG: 5 TABLET ORAL at 17:04

## 2022-09-23 RX ADMIN — SORBITOL SOLUTION (BULK) 30 ML: 70 SOLUTION at 17:04

## 2022-09-23 RX ADMIN — SULFAMETHOXAZOLE AND TRIMETHOPRIM 1 TABLET: 800; 160 TABLET ORAL at 20:33

## 2022-09-23 NOTE — PROGRESS NOTES
Progress Note - Candice Crystal 46 y o  female MRN: [id number]    Unit/Bed#: [facility] 224-02 Encounter: [id number]        Subjective:   Patient seen and examined at bedside after reviewing the chart and discussing the case with the caring staff  Patient examined at bedside  Patient reports she has not had a bowel movement for the past 8 days  Tried Senokot S and MiraLax without improvement  Physical Exam   Vitals: Blood pressure 115/72, pulse 96, temperature 97 8 °F (36 6 °C), temperature source Temporal, resp  rate 16, height 5' 5" (1 651 m), weight 79 kg (174 lb 3 2 oz), SpO2 98 %  ,Body mass index is 28 99 kg/m²  Constitutional: Patient in no acute distress  HEENT: PERR, EOMI, MMM  Cardiovascular: Normal rate and regular rhythm  Pulmonary/Chest: Effort normal and breath sounds normal    Abdomen:  Nondistended, +BS, soft, NT, no rigidity, no guarding, no rebound  Assessment/Plan:  Candice Crystal is a(n) 46 y o  female with bipolar disorder      1  Migraines  Patient is on Topamax 25 mg twice daily  2  Arthralgias  Patient may take Tylenol as needed  3  GERD  Stable  Mylanta as needed  4  Psoriasis  Stable  5  Asthma  Patient may use albuterol inhaler as needed  6  Vitamin D deficiency  Patient started on vitamin D3 1000 units daily  7  Right breast abscess/mastitis  Improving  Patient started on Bactrim DS for 10 days  Wound culture on 09/17 negative  8  Constipation  Patient is on Colace 100 mg daily, Senokot S as needed, MiraLax as needed  Will start patient on sorbitol every hour until BM  The patient was discussed with Dr Gail Martin and he is in agreement with the above note

## 2022-09-23 NOTE — NURSING NOTE
Patient visible on milieu   Alert and oriented  Pleasant and cooperative  Report A/halluciantion controlled  Denies depression, HI/ SI  Schedule PO medication administered as ordered  Appetite good   Continue on safety check

## 2022-09-23 NOTE — NURSING NOTE
Patient was pleasant and cooperative  Patient guarded and watchful  Staff support provided  Q 7 minute safety checks maintained  Patient denied SI/HI  Patient compliant with medications  Staff will continue to monitor and support

## 2022-09-23 NOTE — PLAN OF CARE
Problem: Ineffective Coping  Goal: Participates in unit activities  Description: Interventions:  - Provide therapeutic environment   - Provide required programming   - Redirect inappropriate behaviors   Outcome: Not Progressing     PT declined all offers to attend art therapy groups today and was observed isolating in pt room with minimal social interactions with peers

## 2022-09-23 NOTE — CASE MANAGEMENT
Worker received phone call from Justice ([phone]) at [facility] regarding intake appointment for pt, intake scheduled for 9/30 at 1400  Justice will send text with link to zoom call for telehealth on Friday  Worker to fax information once discharged to [phone]

## 2022-09-23 NOTE — NURSING NOTE
Patient visible on unit remains to self but pleasant and cooperative  Patient continues to show improvement with mood and insight on her dx  Patient med compliant and did not utilize any prns  Patient denies SI HI VH  Endorses AH and anxiety mild  Q 7 min safety checks maintained  Sunday second invega injection due  Anticipated d/c Monday  Q 7 min safety checks maintained

## 2022-09-23 NOTE — PROGRESS NOTES
09/23/22 0730   Activity/Group Checklist   Group   (Patient morning check in with Covid Precautions)   Attendance Attended   Attendance Duration (min) 31-45   Interactions Interacted appropriately   Affect/Mood Appropriate   Goals Achieved Identified feelings; Discussed coping strategies; Increased hopefulness; Able to listen to others; Able to engage in interactions; Able to reflect/comment on own behavior;Able to manage/cope with feelings

## 2022-09-23 NOTE — PROGRESS NOTES
Progress Note - [place] [address] 46 y o  female MRN: [id number]   Unit/Bed#: [facility] 224-02 Encounter: [id number]    Behavior over the last 24 hours: improving  Candice seen today at bedside  Per staff report, pt remains social on unit and continues to show improvement with mood and insight on her diagnosis  Patient did not need to utilize any PRNs last night  Patient is bright and in good spirits this morning  Her mood continues to improve  She reports good sleep and resolution of nightmares after starting Prazosin  She continues to report less frequent auditory hallucinations of whispers, and states the voices are mainly positive  She continues to draw pictures of her AH  She continues to report elevated hypersensitivity to sound, but states keeping her earplugs with her is helpful  She reports decrease of her visual hallucinations of shadows  She reports minimal anxiety and denies feelings of depression  She denies any SI/HI or any thoughts/urges of self-harm  She states she is excited to return home and is hopeful that she will be able to implement her improved coping skills outside of the unit  Pt has improving insight and judgement      Sleep: improved, no nightmares last night  Appetite: improving  Medication side effects: Constipation      Mental Status Evaluation:    Appearance:  age appropriate, casually dressed, adequate grooming, well-nourished   Behavior:  pleasant, cooperative, calm, hyperaware of surroundings, hypersensitive to sounds   Speech:  average rate and volume   Mood:  improved, "Happy"   Affect:  brighter, more appropriate, full   Thought Process:  linear   Associations: intact associations   Thought Content:  paranoid ideation, some negative thinking   Perceptual Disturbances: auditory hallucinations are still present but non-commanding, quieter, less distracting, whispers; decreased visual hallucinations of shadows   Risk Potential: Suicidal ideation - None at present, contracts for safety on the unit, would talk to staff if not feeling safe on the unit  Homicidal ideation - None at present, no plan   Sensorium:  oriented to person, place and time/date         Memory:  recent and remote memory grossly intact      Consciousness:  alert and awake      Attention: attention span and concentration are age appropriate      Insight:  improving      Judgment: improving      Gait/Station: normal gait/station      Motor Activity: no abnormal movements     Vital signs in last 24 hours:    Temp:  [98 5 °F (36 9 °C)] 98 5 °F (36 9 °C)  HR:  [74-88] 74  Resp:  [16] 16  BP: (102-111)/(61-67) 102/61    Laboratory results:   I have personally reviewed all pertinent laboratory/tests results  Most Recent Labs:   Lab Results   Component Value Date    WBC 12 74 (H) 09/15/2022    RBC 4 87 09/15/2022    HGB 14 0 09/15/2022    HCT 44 6 09/15/2022     09/15/2022    RDW 13 7 09/15/2022    NEUTROABS 6 43 09/15/2022    SODIUM 138 09/15/2022    K 3 7 09/15/2022     09/15/2022    CO2 27 09/15/2022    BUN 13 09/15/2022    CREATININE 0 93 09/15/2022    GLUC 78 09/15/2022    GLUF 78 09/15/2022    CALCIUM 9 3 09/15/2022    AST 25 09/15/2022    ALT 8 09/15/2022    ALKPHOS 67 09/15/2022    TP 7 3 09/15/2022    ALB 4 1 09/15/2022    TBILI 0 56 09/15/2022    CHOLESTEROL 195 09/15/2022    HDL 41 (L) 09/15/2022    TRIG 87 09/15/2022    LDLCALC 137 (H) 09/15/2022    [place] 154 09/15/2022    [id number] 3 023 09/15/2022    PREGUR negative 09/11/2022         Assessment/Plan   Principal Problem:    Bipolar 1 disorder ([facility] [address] )    Recommended Treatment:     Planned medication and treatment changes:  Initiate Buspar 5 mg BID for anxiety  Continue Prazosin 1 mg BID; will monitor blood pressure  Received Merrily Mary Carmen injection 234 mg IM initiation dose on 09/20  Will receive second initiation dose of Invega Sustenna 156 mg IM on 9/25  Continue Invega to 9 mg daily  Continue Cogentin 1 mg BID   Continue Zyprexa to 5 mg QHS  Continue to monitor for improvement  Patient is anticipated for discharge next Monday      All current active medications have been reviewed  Encourage group therapy, milieu therapy and occupational therapy  Continue treatment with group therapy, milieu therapy and occupational therapy  Behavioral Health checks every 7 minutes  Current Facility-Administered Medications   Medication Dose Route Frequency Provider Last Rate    acetaminophen  650 mg Oral Q6H PRN Emilia Kalaga, DO      acetaminophen  650 mg Oral Q4H PRN Emilia Kalaga, DO      acetaminophen  975 mg Oral Q6H PRN Emilia Kalaga, DO      albuterol  2 puff Inhalation Q4H PRN Renetta Baker PA-C      aluminum-magnesium hydroxide-simethicone  30 mL Oral Q4H PRN Emilia Kalaga, DO      benztropine  1 mg Intramuscular Q4H PRN Max 6/day Willadean Pu, DO      benztropine  1 mg Oral Q4H PRN Max 6/day Willadean Pu, DO      benztropine  1 mg Oral BID Mimi Woody MD      busPIRone  5 mg Oral BID Willadean Pu, DO      cholecalciferol  1,000 Units Oral Daily Renetta Baker PA-C      hydrOXYzine HCL  50 mg Oral Q6H PRN Max 4/day Emilia Kalaga, DO      Or    diphenhydrAMINE  50 mg Intramuscular Q6H PRN Emilia Kalaga, DO      docusate sodium  100 mg Oral Daily Emilia Kalaga, DO      haloperidol  5 mg Oral Daily PRN Elenor [place] Medei, CRNP      hydrOXYzine HCL  100 mg Oral Q6H PRN Max 4/day Emilia Kalaga, DO      Or    LORazepam  2 mg Intramuscular Q6H PRN Emilia Kalaga, DO      hydrOXYzine HCL  25 mg Oral Q6H PRN Max 4/day Emilia Kalaga, DO      LORazepam  0 5 mg Oral Q6H PRN Emilia Kalaga, DO      OLANZapine  5 mg Oral HS Emilia Kalaga, DO      OLANZapine  10 mg Oral Q3H PRN Max 3/day Emilia Kalaga, DO      Or    OLANZapine  10 mg Intramuscular Q3H PRN Max 3/day Emilia Kalaga, DO      OLANZapine  5 mg Oral Q3H PRN Max 6/day Emilia Kalaga, DO      Or    OLANZapine  5 mg Intramuscular Q3H PRN Max 6/day Emilia Jr,       OLANZapine  2 5 mg Oral Q3H PRN Max 8/day Avi Subramanian,       paliperidone  9 mg Oral Daily Agatha Carlton MD      [START ON 9/25/2022] paliperidone palmitate ER  156 mg Intramuscular Once Avi Subramanian, DO      polyethylene glycol  17 g Oral Daily PRN Avi Subramanian, DO      prazosin  1 mg Oral BID Emilia Norris DO      senna-docusate sodium  1 tablet Oral Daily PRN Emilia Norris, DO      sorbitol  30 mL Oral Q1H PRN Renetta Baker PA-C      sulfamethoxazole-trimethoprim  1 tablet Oral Q12H [facility] Rosalva Vilchis MD      topiramate  25 mg Oral BID Levy Anderson MD      traZODone  50 mg Oral HS PRN Avi Subramanian, DO         Risks / Benefits of Treatment:    Risks, benefits, and possible side effects of medications explained to patient and patient verbalizes understanding and agreement for treatment  Counseling / Coordination of Care: Total floor / unit time spent today 25 minutes  Greater than 50% of total time was spent with the patient and / or family counseling and / or coordination of care  A description of counseling / coordination of care:  Patient's progress discussed with staff in treatment team meeting  Medications, treatment progress and treatment plan reviewed with patient      Avi Subramanian DO 09/23/22

## 2022-09-23 NOTE — PROGRESS NOTES
09/23/22   Team Meeting   Meeting Type Daily Rounds   Team Members Present   Team Members Present Physician;Nurse;   Physician Team Member Dr Minnie Galvez MD; Dr Melina Johns, DO; Mara Moreno PA-C   Nursing Team Member Kp Rodriguez RN   Social Work Team Member Roxie Powell [place]   Patient/Family Present   Patient Present No   Patient's Family Present No     No D/c Monday, no behaviors, AH mild, hyper sensitive to sound, starting Buspar during day, paranoid about food

## 2022-09-23 NOTE — PROGRESS NOTES
09/23/22 1030   Activity/Group Checklist   Group   (Gratitude Reflections Art Therapy)   Attendance Did not attend  (AT group offered, PT elected to remain in room)

## 2022-09-23 NOTE — PLAN OF CARE
Problem: Alteration in Thoughts and Perception  Goal: Verbalize thoughts and feelings  Description: Interventions:  - Promote a nonjudgmental and trusting relationship with the patient through active listening and therapeutic communication  - Assess patient's level of functioning, behavior and potential for risk  - Engage patient in 1 on 1 interactions  - Encourage patient to express fears, feelings, frustrations, and discuss symptoms    - [place] patient to reality, help patient recognize reality-based thinking   - Administer medications as ordered and assess for potential side effects  - Provide the patient education related to the signs and symptoms of the illness and desired effects of prescribed medications  Outcome: Progressing  Goal: Agree to be compliant with medication regime, as prescribed and report medication side effects  Description: Interventions:  - Offer appropriate PRN medication and supervise ingestion; conduct AIMS, as needed   Outcome: Progressing  Goal: Attend and participate in unit activities, including therapeutic, recreational, and educational groups  Description: Interventions:  -Encourage Visitation and family involvement in care  Outcome: Progressing  Goal: Recognize dysfunctional thoughts, communicate reality-based thoughts at the time of discharge  Description: Interventions:  - Provide medication and psycho-education to assist patient in compliance and developing insight into his/her illness   Outcome: Progressing     Problem: Ineffective Coping  Goal: Identifies ineffective coping skills  Outcome: Progressing  Goal: Identifies healthy coping skills  Outcome: Progressing  Goal: Demonstrates healthy coping skills  Outcome: Progressing     Problem: Risk for Self Injury/Neglect  Goal: Treatment Goal: Remain safe during length of stay, learn and adopt new coping skills, and be free of self-injurious ideation, impulses and acts at the time of discharge  Outcome: Progressing  Goal: Refrain from harming self  Description: Interventions:  - Monitor patient closely, per order  - Develop a trusting relationship  - Supervise medication ingestion, monitor effects and side effects   Outcome: Progressing  Goal: Recognize maladaptive responses and adopt new coping mechanisms  Outcome: Progressing  Goal: Complete daily ADLs, including personal hygiene independently, as able  Description: Interventions:  - Observe, teach, and assist patient with ADLS  - Monitor and promote a balance of rest/activity, with adequate nutrition and elimination  Outcome: Progressing     Problem: Depression  Goal: Refrain from isolation  Description: Interventions:  - Develop a trusting relationship   - Encourage socialization   Outcome: Progressing  Goal: Refrain from self-neglect  Outcome: Progressing     Problem: Anxiety  Goal: Anxiety is at manageable level  Description: Interventions:  - Assess and monitor patient's anxiety level  - Monitor for signs and symptoms (heart palpitations, chest pain, shortness of breath, headaches, nausea, feeling jumpy, restlessness, irritable, apprehensive)  - Collaborate with interdisciplinary team and initiate plan and interventions as ordered    - [place] patient to unit/surroundings  - Explain treatment plan  - Encourage participation in care  - Encourage verbalization of concerns/fears  - Identify coping mechanisms  - Assist in developing anxiety-reducing skills  - Administer/offer alternative therapies  - Limit or eliminate stimulants  Outcome: Progressing     Problem: Ineffective Coping  Goal: Participates in unit activities  Description: Interventions:  - Provide therapeutic environment   - Provide required programming   - Redirect inappropriate behaviors   Outcome: Progressing     Problem: DISCHARGE PLANNING - CARE MANAGEMENT  Goal: Discharge to post-acute care or home with appropriate resources  Description: INTERVENTIONS:  - Conduct assessment to determine patient/family and health care team treatment goals, and need for post-acute services based on payer coverage, community resources, and patient preferences, and barriers to discharge  - Address psychosocial, clinical, and financial barriers to discharge as identified in assessment in conjunction with the patient/family and health care team  - Arrange appropriate level of post-acute services according to patients   needs and preference and payer coverage in collaboration with the physician and health care team  - Communicate with and update the patient/family, physician, and health care team regarding progress on the discharge plan  - Arrange appropriate transportation to post-acute venues  Outcome: Progressing

## 2022-09-24 PROCEDURE — 99232 SBSQ HOSP IP/OBS MODERATE 35: CPT | Performed by: NURSE PRACTITIONER

## 2022-09-24 RX ORDER — BUSPIRONE HYDROCHLORIDE 10 MG/1
10 TABLET ORAL 2 TIMES DAILY
Status: DISCONTINUED | OUTPATIENT
Start: 2022-09-24 | End: 2022-09-27 | Stop reason: HOSPADM

## 2022-09-24 RX ADMIN — BUSPIRONE HYDROCHLORIDE 5 MG: 5 TABLET ORAL at 08:56

## 2022-09-24 RX ADMIN — HYDROXYZINE HYDROCHLORIDE 100 MG: 50 TABLET, FILM COATED ORAL at 10:41

## 2022-09-24 RX ADMIN — ALUMINUM HYDROXIDE, MAGNESIUM HYDROXIDE, AND SIMETHICONE 30 ML: 200; 200; 20 SUSPENSION ORAL at 14:04

## 2022-09-24 RX ADMIN — HYDROXYZINE HYDROCHLORIDE 50 MG: 50 TABLET, FILM COATED ORAL at 18:06

## 2022-09-24 RX ADMIN — TOPIRAMATE 25 MG: 25 TABLET, FILM COATED ORAL at 17:05

## 2022-09-24 RX ADMIN — PALIPERIDONE 9 MG: 6 TABLET, EXTENDED RELEASE ORAL at 08:56

## 2022-09-24 RX ADMIN — BENZTROPINE MESYLATE 1 MG: 1 TABLET ORAL at 08:56

## 2022-09-24 RX ADMIN — BENZTROPINE MESYLATE 1 MG: 1 TABLET ORAL at 17:05

## 2022-09-24 RX ADMIN — OLANZAPINE 5 MG: 5 TABLET, ORALLY DISINTEGRATING ORAL at 20:50

## 2022-09-24 RX ADMIN — PRAZOSIN HYDROCHLORIDE 1 MG: 1 CAPSULE ORAL at 08:56

## 2022-09-24 RX ADMIN — TOPIRAMATE 25 MG: 25 TABLET, FILM COATED ORAL at 08:56

## 2022-09-24 RX ADMIN — DOCUSATE SODIUM 100 MG: 100 CAPSULE, LIQUID FILLED ORAL at 08:56

## 2022-09-24 RX ADMIN — SULFAMETHOXAZOLE AND TRIMETHOPRIM 1 TABLET: 800; 160 TABLET ORAL at 08:56

## 2022-09-24 RX ADMIN — BUSPIRONE HYDROCHLORIDE 10 MG: 10 TABLET ORAL at 17:06

## 2022-09-24 RX ADMIN — SULFAMETHOXAZOLE AND TRIMETHOPRIM 1 TABLET: 800; 160 TABLET ORAL at 20:50

## 2022-09-24 RX ADMIN — PRAZOSIN HYDROCHLORIDE 1 MG: 1 CAPSULE ORAL at 17:05

## 2022-09-24 RX ADMIN — CHOLECALCIFEROL TAB 25 MCG (1000 UNIT) 1000 UNITS: 25 TAB at 08:56

## 2022-09-24 RX ADMIN — SORBITOL SOLUTION (BULK) 30 ML: 70 SOLUTION at 17:08

## 2022-09-24 NOTE — NURSING NOTE
Patient was anxious and restless throughout the shift  Patient social with staff and peers  Staff support provided  Q 7 minute safety checks maintained  Patient compliant with medications and groups  Patient remains anxious and restless  Patient denies SI/HI  Staff will continue to monitor and support

## 2022-09-24 NOTE — PROGRESS NOTES
Progress Note - Linsey Gains 46 y o  female MRN: [id number]    Unit/Bed#: [facility] 254-01 Encounter: [id number]      Assessment:  1  Vamsi Kwok is on Topamax 25 mg twice daily  2  Arthralgias   Patient may take Tylenol as needed  3  GERD  Jez Reynaman as needed  4  Psoriasis   Stable  5  Asthma   Patient may use albuterol inhaler as needed  6  Vitamin D deficiency   Patient started on vitamin D3 1000 units daily  7  Right breast abscess/mastitis  Improving  Patient started on Bactrim DS for 10 days  Wound culture on 09/17 negative  8  Constipation  Patient is on Colace 100 mg daily, Senokot S as needed, MiraLax as needed  Will start patient on sorbitol every hour until BM        Plan:  See above    Subjective:   No complaints    Objective:     Vitals: Blood pressure 131/74, pulse (!) 108, temperature 98 2 °F (36 8 °C), temperature source Temporal, resp  rate 16, height 5' 5" (1 651 m), weight 57 5 kg (126 lb 12 8 oz), SpO2 98 %  ,Body mass index is 21 1 kg/m²      No intake or output data in the 24 hours ending 09/24/22 1822    Physical Exam: /74 (BP Location: Left arm)   Pulse (!) 108   Temp 98 2 °F (36 8 °C) (Temporal)   Resp 16   Ht 5' 5" (1 651 m)   Wt 57 5 kg (126 lb 12 8 oz)   SpO2 98%   BMI 21 10 kg/m²     General Appearance:    Alert, cooperative, no distress, appears stated age   Head:    Normocephalic, without obvious abnormality, atraumatic                   Neck:   Supple, symmetrical, trachea midline, no adenopathy;     thyroid:  no enlargement/tenderness/nodules; no carotid    bruit or JVD   Back:     Symmetric, no curvature, ROM normal, no CVA tenderness   Lungs:     Clear to auscultation bilaterally, respirations unlabored   Chest Wall:    No tenderness or deformity    Heart:    Regular rate and rhythm, S1 and S2 normal, no murmur, rub   or gallop       Abdomen:     Soft, non-tender, bowel sounds active all four quadrants,     no masses, no organomegaly Extremities:   Extremities normal, atraumatic, no cyanosis or edema   Pulses:   2+ and symmetric all extremities   Skin:   Skin color, texture, turgor normal, no rashes or lesions   Lymph nodes:   Cervical, supraclavicular, and axillary nodes normal            Invasive Devices  Report    None                 Lab, Imaging and other studies: I have personally reviewed pertinent reports

## 2022-09-24 NOTE — PROGRESS NOTES
Progress Note - [place] [address] 46 y o  female MRN: [id number]   Unit/Bed#: [facility] 254-01 Encounter: [id number]    Behavior over the last 24 hours: unchanged  Mary Cano is seen in the provider's office  Some anxiety noted, and patient reports she was always guarded and suspicious around others  Ongoing auditory hallucinations, but only a mumble now and decrease frquency  Staff reports she is pleasant and cooperative on the unit  Uncovertebral around certain peers  Tolerating medications and scheduled for [facility] tomorrow  Limited participation in milieu  Sleep: slept off and on  Appetite: fair  Medication side effects: No   ROS: all other systems are negative    Mental Status Evaluation:    Appearance:  age appropriate   Behavior:  pleasant, cooperative   Speech:  normal rate, normal volume   Mood:  depressed   Affect:  flat   Thought Process:  goal directed   Associations: concrete associations   Thought Content:  paranoid ideation   Perceptual Disturbances: auditory hallucinations still present, but less frequent   Risk Potential: Suicidal ideation - None  Homicidal ideation - None  Potential for aggression - No   Sensorium:  oriented to person, place and time/date   Memory:  recent and remote memory grossly intact   Consciousness:  alert and awake   Attention: decreased concentration and decreased attention span   Insight:  limited   Judgment: limited   Gait/Station: normal gait/station, normal balance   Motor Activity: no abnormal movements     Vital signs in last 24 hours:    Temp:  [97 8 °F (36 6 °C)-98 3 °F (36 8 °C)] 98 3 °F (36 8 °C)  HR:  [82-96] 82  Resp:  [16] 16  BP: ()/(57-72) 96/57    Laboratory results: I have personally reviewed all pertinent laboratory/tests results        Progress Toward Goals: continues to improve    Assessment/Plan   Principal Problem:    Bipolar 1 disorder (HCC)    Recommended Treatment:     Planned medication and treatment changes: All current active medications have been reviewed  Encourage group therapy, milieu therapy and occupational therapy  Behavioral Health checks every 7 minutes    Requires continued inpatient treatment due to chronic illness and high risk of decompensation if discharged before long term stability is achieved      Continue current medications:  Current Facility-Administered Medications   Medication Dose Route Frequency Provider Last Rate    acetaminophen  650 mg Oral Q6H PRN Juanice Ice, DO      acetaminophen  650 mg Oral Q4H PRN Juanice Ice, DO      acetaminophen  975 mg Oral Q6H PRN Emilia Kalaga, DO      albuterol  2 puff Inhalation Q4H PRN RHIANNON AguillonC      aluminum-magnesium hydroxide-simethicone  30 mL Oral Q4H PRN Emilia Kalaga, DO      benztropine  1 mg Intramuscular Q4H PRN Max 6/day Juanice Ice, DO      benztropine  1 mg Oral Q4H PRN Max 6/day Juanice Ice, DO      benztropine  1 mg Oral BID Wilton Stanford MD      busPIRone  10 mg Oral BID DANISHA Staley      cholecalciferol  1,000 Units Oral Daily Renetta Baker PA-C      hydrOXYzine HCL  50 mg Oral Q6H PRN Max 4/day Emilia Kalaga, DO      Or    diphenhydrAMINE  50 mg Intramuscular Q6H PRN Emilia Kalaga, DO      docusate sodium  100 mg Oral Daily Emilia Kalaga, DO      haloperidol  5 mg Oral Daily PRN Verdis Night Medei, CRNP      hydrOXYzine HCL  100 mg Oral Q6H PRN Max 4/day Emilia Kalaga, DO      Or    LORazepam  2 mg Intramuscular Q6H PRN Emilia Kalaga, DO      hydrOXYzine HCL  25 mg Oral Q6H PRN Max 4/day Emilia Kalaga, DO      LORazepam  0 5 mg Oral Q6H PRN Emilia Kalaga, DO      OLANZapine  5 mg Oral HS Emilia Kalaga, DO      OLANZapine  10 mg Oral Q3H PRN Max 3/day Emilia Kalaga, DO      Or    OLANZapine  10 mg Intramuscular Q3H PRN Max 3/day Emilia Kalaga, DO      OLANZapine  5 mg Oral Q3H PRN Max 6/day Emilia Kalaga, DO      Or    OLANZapine  5 mg Intramuscular Q3H PRN Max 6/day Emilia Norris DO      OLANZapine  2 5 mg Oral Q3H PRN Max 8/day Gabrielle Ley DO      paliperidone  9 mg Oral Daily Bharti Sloan MD      [START ON 9/25/2022] paliperidone palmitate ER  156 mg Intramuscular Once Gabrielle Ley DO      polyethylene glycol  17 g Oral Daily PRN Gabrielle Ley DO      prazosin  1 mg Oral BID Emilia Norris DO      senna-docusate sodium  1 tablet Oral Daily PRN Emilia Norris DO      sorbitol  30 mL Oral Q1H PRN Renetta Baker PA-C      sulfamethoxazole-trimethoprim  1 tablet Oral Q12H [address] Wei Salcedo MD      topiramate  25 mg Oral BID Delvin Hauser MD      traZODone  50 mg Oral HS PRN Gabrielle Ley DO         Risks / Benefits of Treatment:    Risks, benefits, and possible side effects of medications explained to patient and patient verbalizes understanding and agreement for treatment  Counseling / Coordination of Care:    Patient's progress discussed with staff in treatment team meeting  Medications, treatment progress and treatment plan reviewed with patient      DANISHA Greenwood 09/24/22

## 2022-09-24 NOTE — PLAN OF CARE
Problem: Alteration in Thoughts and Perception  Goal: Verbalize thoughts and feelings  Description: Interventions:  - Promote a nonjudgmental and trusting relationship with the patient through active listening and therapeutic communication  - Assess patient's level of functioning, behavior and potential for risk  - Engage patient in 1 on 1 interactions  - Encourage patient to express fears, feelings, frustrations, and discuss symptoms    - [place] patient to reality, help patient recognize reality-based thinking   - Administer medications as ordered and assess for potential side effects  - Provide the patient education related to the signs and symptoms of the illness and desired effects of prescribed medications  Outcome: Progressing  Goal: Agree to be compliant with medication regime, as prescribed and report medication side effects  Description: Interventions:  - Offer appropriate PRN medication and supervise ingestion; conduct AIMS, as needed   Outcome: Progressing  Goal: Attend and participate in unit activities, including therapeutic, recreational, and educational groups  Description: Interventions:  -Encourage Visitation and family involvement in care  Outcome: Progressing  Goal: Recognize dysfunctional thoughts, communicate reality-based thoughts at the time of discharge  Description: Interventions:  - Provide medication and psycho-education to assist patient in compliance and developing insight into his/her illness   Outcome: Progressing     Problem: Ineffective Coping  Goal: Identifies healthy coping skills  Outcome: Progressing     Problem: Risk for Self Injury/Neglect  Goal: Refrain from harming self  Description: Interventions:  - Monitor patient closely, per order  - Develop a trusting relationship  - Supervise medication ingestion, monitor effects and side effects   Outcome: Progressing  Goal: Complete daily ADLs, including personal hygiene independently, as able  Description: Interventions:  - Observe, teach, and assist patient with ADLS  - Monitor and promote a balance of rest/activity, with adequate nutrition and elimination  Outcome: Progressing     Problem: Depression  Goal: Refrain from isolation  Description: Interventions:  - Develop a trusting relationship   - Encourage socialization   Outcome: Progressing  Goal: Refrain from self-neglect  Outcome: Progressing

## 2022-09-24 NOTE — NURSING NOTE
Assumed care of patient at 1900  Patient was calm and cooperative with care  Denied any pain or current SI/HI/AVH  Visible on the unit and social with peers  Given Mylanta for heartburn at 2000  Patient used the phone a couple of times  Came to this nurse very upset stating that another patient stands behind her and makes comments about her conversations  Reassured patient, gave Atarax 100 at 2013 and she sat in the quiet room for 15 minutes till she calmed down  Ate snack and talked to peers again  Watched the tablet in her room  Safety checks maintained  Will continue to monitor

## 2022-09-25 LAB — GLUCOSE SERPL-MCNC: 113 MG/DL (ref 65–140)

## 2022-09-25 PROCEDURE — 99232 SBSQ HOSP IP/OBS MODERATE 35: CPT | Performed by: NURSE PRACTITIONER

## 2022-09-25 PROCEDURE — 82948 REAGENT STRIP/BLOOD GLUCOSE: CPT

## 2022-09-25 RX ORDER — SORBITOL SOLUTION 70 %
30 SOLUTION, ORAL MISCELLANEOUS
Status: DISCONTINUED | OUTPATIENT
Start: 2022-09-25 | End: 2022-09-27 | Stop reason: HOSPADM

## 2022-09-25 RX ORDER — PANTOPRAZOLE SODIUM 40 MG/1
40 TABLET, DELAYED RELEASE ORAL
Status: DISCONTINUED | OUTPATIENT
Start: 2022-09-25 | End: 2022-09-27 | Stop reason: HOSPADM

## 2022-09-25 RX ORDER — PALIPERIDONE 6 MG/1
6 TABLET, EXTENDED RELEASE ORAL DAILY
Status: DISCONTINUED | OUTPATIENT
Start: 2022-09-26 | End: 2022-09-26

## 2022-09-25 RX ADMIN — BENZTROPINE MESYLATE 1 MG: 1 TABLET ORAL at 08:29

## 2022-09-25 RX ADMIN — HYDROXYZINE HYDROCHLORIDE 100 MG: 50 TABLET, FILM COATED ORAL at 16:20

## 2022-09-25 RX ADMIN — CHOLECALCIFEROL TAB 25 MCG (1000 UNIT) 1000 UNITS: 25 TAB at 08:29

## 2022-09-25 RX ADMIN — BUSPIRONE HYDROCHLORIDE 10 MG: 10 TABLET ORAL at 17:13

## 2022-09-25 RX ADMIN — TRAZODONE HYDROCHLORIDE 50 MG: 50 TABLET ORAL at 20:32

## 2022-09-25 RX ADMIN — PANTOPRAZOLE SODIUM 40 MG: 40 TABLET, DELAYED RELEASE ORAL at 18:06

## 2022-09-25 RX ADMIN — SULFAMETHOXAZOLE AND TRIMETHOPRIM 1 TABLET: 800; 160 TABLET ORAL at 08:29

## 2022-09-25 RX ADMIN — PRAZOSIN HYDROCHLORIDE 1 MG: 1 CAPSULE ORAL at 08:29

## 2022-09-25 RX ADMIN — PALIPERIDONE PALMITATE 156 MG: 156 INJECTION INTRAMUSCULAR at 09:24

## 2022-09-25 RX ADMIN — TOPIRAMATE 25 MG: 25 TABLET, FILM COATED ORAL at 08:29

## 2022-09-25 RX ADMIN — TOPIRAMATE 25 MG: 25 TABLET, FILM COATED ORAL at 17:13

## 2022-09-25 RX ADMIN — SULFAMETHOXAZOLE AND TRIMETHOPRIM 1 TABLET: 800; 160 TABLET ORAL at 20:31

## 2022-09-25 RX ADMIN — BUSPIRONE HYDROCHLORIDE 10 MG: 10 TABLET ORAL at 08:29

## 2022-09-25 RX ADMIN — ALUMINUM HYDROXIDE, MAGNESIUM HYDROXIDE, AND SIMETHICONE 30 ML: 200; 200; 20 SUSPENSION ORAL at 14:45

## 2022-09-25 RX ADMIN — PALIPERIDONE 9 MG: 6 TABLET, EXTENDED RELEASE ORAL at 08:29

## 2022-09-25 RX ADMIN — PRAZOSIN HYDROCHLORIDE 1 MG: 1 CAPSULE ORAL at 17:13

## 2022-09-25 RX ADMIN — OLANZAPINE 5 MG: 5 TABLET, ORALLY DISINTEGRATING ORAL at 20:32

## 2022-09-25 RX ADMIN — BENZTROPINE MESYLATE 1 MG: 1 TABLET ORAL at 17:13

## 2022-09-25 RX ADMIN — DOCUSATE SODIUM 100 MG: 100 CAPSULE, LIQUID FILLED ORAL at 08:29

## 2022-09-25 NOTE — PROGRESS NOTES
Progress Note - [place] [address] 46 y o  female MRN: [id number]   Unit/Bed#: [facility] 254-01 Encounter: [id number]    Behavior over the last 24 hours: improved  Nita Rogers is seen in the provider office   She is pleasant and cooperative  Asks appropriate questions about possible discharge not that she has received 2nd dose of [place]  Also, appropriate questions about continued oral Invega and Zyprexa, not that she in on monthly [place]  States she would rather take higher dose of [place] monthly, than have to continue on both oral antipsychotics orally  I did explain that would continued to be evaluated by her outpatient psychiatrist   Will decrease her oral Invega to 6 mg, now that she has received 2nd loading dose  Continues to report improvement and hallucinations "barely" audible and paranoia at baseline  Limited participation in milieu      Sleep: slept off and on  Appetite: fair  Medication side effects: No   ROS: all other systems are negative    Mental Status Evaluation:    Appearance:  age appropriate   Behavior:  pleasant, cooperative   Speech:  normal rate, normal volume   Mood:  depressed   Affect:  flat   Thought Process:  circumstantial   Associations: intact associations   Thought Content:  some paranoia   Perceptual Disturbances: vague auditory hallucinations   Risk Potential: Suicidal ideation - None  Homicidal ideation - None  Potential for aggression - No   Sensorium:  oriented to person, place and time/date   Memory:  recent and remote memory grossly intact   Consciousness:  alert and awake   Attention: decreased concentration and decreased attention span   Insight:  limited   Judgment: limited   Gait/Station: normal gait/station, normal balance   Motor Activity: no abnormal movements     Vital signs in last 24 hours:    Temp:  [97 7 °F (36 5 °C)-98 2 °F (36 8 °C)] 97 7 °F (36 5 °C)  HR:  [] 97  Resp:  [16-18] 18  BP: ()/(63-74) 94/63    Laboratory results: I have personally reviewed all pertinent laboratory/tests results  Progress Toward Goals: progressing    Assessment/Plan   Principal Problem:    Bipolar 1 disorder ([address] )    Recommended Treatment:     Planned medication and treatment changes: All current active medications have been reviewed  Encourage group therapy, milieu therapy and occupational therapy  Behavioral Health checks every 7 minutes    Requires continued inpatient treatment due to chronic illness and high risk of decompensation if discharged before long term stability is achieved      Continue current medications:  Current Facility-Administered Medications   Medication Dose Route Frequency Provider Last Rate    acetaminophen  650 mg Oral Q6H PRN Willadean Pu, DO      acetaminophen  650 mg Oral Q4H PRN Willadean Pu, DO      acetaminophen  975 mg Oral Q6H PRN Emilia Kalaga, DO      albuterol  2 puff Inhalation Q4H PRN Renetta Baker PA-C      aluminum-magnesium hydroxide-simethicone  30 mL Oral Q4H PRN Emilia Kalaga, DO      benztropine  1 mg Intramuscular Q4H PRN Max 6/day Willadean Pu, DO      benztropine  1 mg Oral Q4H PRN Max 6/day Willadean Pu, DO      benztropine  1 mg Oral BID Mimi Woody MD      busPIRone  10 mg Oral BID DANISHA Little      cholecalciferol  1,000 Units Oral Daily Renetta Baker PA-C      hydrOXYzine HCL  50 mg Oral Q6H PRN Max 4/day Emilia Kalaga, DO      Or    diphenhydrAMINE  50 mg Intramuscular Q6H PRN Emilia Kalaga, DO      docusate sodium  100 mg Oral Daily Emilia Kalaga, DO      haloperidol  5 mg Oral Daily PRN Antoinette Culp, CRNP      hydrOXYzine HCL  100 mg Oral Q6H PRN Max 4/day Emilia Kalaga, DO      Or    LORazepam  2 mg Intramuscular Q6H PRN Emilia Kalaga, DO      hydrOXYzine HCL  25 mg Oral Q6H PRN Max 4/day Emilia Kalaga, DO      LORazepam  0 5 mg Oral Q6H PRN Emilia Kalaga, DO      OLANZapine  5 mg Oral HS Willadean Pu, DO  OLANZapine  10 mg Oral Q3H PRN Max 3/day Emilia Kalaga, DO      Or    OLANZapine  10 mg Intramuscular Q3H PRN Max 3/day Emilia Kalaga, DO      OLANZapine  5 mg Oral Q3H PRN Max 6/day Emilia Kalaga, DO      Or    OLANZapine  5 mg Intramuscular Q3H PRN Max 6/day Cici Ouch, DO      OLANZapine  2 5 mg Oral Q3H PRN Max 8/day Emilia Kalaga, DO      [START ON 9/26/2022] paliperidone  6 mg Oral Daily DANISHA Ozuna      polyethylene glycol  17 g Oral Daily PRN Emilia Kalaga, DO      prazosin  1 mg Oral BID Emilia Kalaga, DO      senna-docusate sodium  1 tablet Oral Daily PRN Emilia Kalaga, DO      sorbitol  30 mL Oral Q1H PRN Renetta Baker PA-C      sulfamethoxazole-trimethoprim  1 tablet Oral Q12H [address] Jimmy Morales MD      topiramate  25 mg Oral BID Isaura Faust MD      traZODone  50 mg Oral HS PRN Cici Ouch, DO         Risks / Benefits of Treatment:    Risks, benefits, and possible side effects of medications explained to patient and patient verbalizes understanding and agreement for treatment  Counseling / Coordination of Care:    Patient's progress discussed with staff in treatment team meeting  Medications, treatment progress and treatment plan reviewed with patient      DANISHA Ozuna 09/25/22

## 2022-09-25 NOTE — NURSING NOTE
Patient was anxious and restless throughout the shift  Patient seeks staff for support  Q 7 minute safety checks maintained  Patient pacing the halls throughout the shifty  Patient reported to nurse that peers are stressing her out  Patient denied SI/HI  Patient much less paranoid  Patient compliant with medications and groups  Staff will continue to monitor and support

## 2022-09-25 NOTE — PLAN OF CARE
Problem: Alteration in Thoughts and Perception  Goal: Verbalize thoughts and feelings  Description: Interventions:  - Promote a nonjudgmental and trusting relationship with the patient through active listening and therapeutic communication  - Assess patient's level of functioning, behavior and potential for risk  - Engage patient in 1 on 1 interactions  - Encourage patient to express fears, feelings, frustrations, and discuss symptoms    - [place] patient to reality, help patient recognize reality-based thinking   - Administer medications as ordered and assess for potential side effects  - Provide the patient education related to the signs and symptoms of the illness and desired effects of prescribed medications  Outcome: Progressing  Goal: Agree to be compliant with medication regime, as prescribed and report medication side effects  Description: Interventions:  - Offer appropriate PRN medication and supervise ingestion; conduct AIMS, as needed   Outcome: Progressing  Goal: Attend and participate in unit activities, including therapeutic, recreational, and educational groups  Description: Interventions:  -Encourage Visitation and family involvement in care  Outcome: Progressing  Goal: Recognize dysfunctional thoughts, communicate reality-based thoughts at the time of discharge  Description: Interventions:  - Provide medication and psycho-education to assist patient in compliance and developing insight into his/her illness   Outcome: Progressing     Problem: Ineffective Coping  Goal: Identifies ineffective coping skills  Outcome: Progressing  Goal: Identifies healthy coping skills  Outcome: Progressing  Goal: Demonstrates healthy coping skills  Outcome: Progressing

## 2022-09-25 NOTE — PROGRESS NOTES
Progress Note - Rahel Crabtree 46 y o  female MRN: [id number]    Unit/Bed#: [facility] 218-02 Encounter: [id number]        Subjective:   Patient seen and examined at bedside after reviewing the chart and discussing the case with the caring staff  Patient examined at bedside  Patient is being bothered by acid reflux symptoms  Patient is requesting a medicine every day  Isola Organ Physical Exam   Vitals: Blood pressure 93/67, pulse 94, temperature 97 6 °F (36 4 °C), temperature source Temporal, resp  rate 16, height 5' 5" (1 651 m), weight 57 5 kg (126 lb 12 8 oz), SpO2 98 %  ,Body mass index is 21 1 kg/m²  Constitutional: Patient in no acute distress  HEENT: PERR, EOMI, MMM  Cardiovascular: Normal rate and regular rhythm  Pulmonary/Chest: Effort normal and breath sounds normal    Abdomen:  Nondistended, +BS, soft, NT, no rigidity, no guarding, no rebound  Assessment/Plan:  Rahel Crabtree is a(n) 46 y o  female with bipolar disorder      1  Migraines  Patient is on Topamax 25 mg twice daily  2  Arthralgias  Patient may take Tylenol as needed  3  Psoriasis  Stable  4  Asthma  Patient may use albuterol inhaler as needed  5  Vitamin D deficiency  Patient started on vitamin D3 1000 units daily  6  Right breast abscess/mastitis  Improving  Patient started on Bactrim DS for 10 days  Wound culture on 09/17 negative  7  Constipation  Patient is on Colace 100 mg daily, Senokot S as needed, MiraLax as needed  Will start patient on sorbitol every hour until BM  8  GERD  I will put the patient on Protonix 40 mg daily along with  Mylanta as needed

## 2022-09-25 NOTE — NURSING NOTE
Patient's affect brighter this evening  Reports improved mood and softer AH  She continues to endorse having trouble with managing anxiety however reports having a much better day than yesterday  Denies SI and HI  Compliant with medication as ordered  Uses appropriate coping skills with peers  No other needs identified  Will remain on safety precautions and continual monitoring

## 2022-09-26 PROBLEM — F41.9 ANXIETY: Status: ACTIVE | Noted: 2018-12-14

## 2022-09-26 PROBLEM — G43.119 INTRACTABLE MIGRAINE WITH AURA WITHOUT STATUS MIGRAINOSUS: Status: ACTIVE | Noted: 2019-04-16

## 2022-09-26 PROBLEM — J45.909 ASTHMA: Status: ACTIVE | Noted: 2022-09-26

## 2022-09-26 PROBLEM — K58.1 IRRITABLE BOWEL SYNDROME WITH CONSTIPATION: Status: ACTIVE | Noted: 2018-12-14

## 2022-09-26 PROCEDURE — 99232 SBSQ HOSP IP/OBS MODERATE 35: CPT | Performed by: HOSPITALIST

## 2022-09-26 RX ORDER — PANTOPRAZOLE SODIUM 40 MG/1
40 TABLET, DELAYED RELEASE ORAL
Qty: 30 TABLET | Refills: 1 | Status: SHIPPED | OUTPATIENT
Start: 2022-09-27

## 2022-09-26 RX ORDER — PALIPERIDONE 3 MG/1
3 TABLET, EXTENDED RELEASE ORAL DAILY
Status: DISCONTINUED | OUTPATIENT
Start: 2022-09-27 | End: 2022-09-27 | Stop reason: HOSPADM

## 2022-09-26 RX ORDER — BUSPIRONE HYDROCHLORIDE 10 MG/1
10 TABLET ORAL 2 TIMES DAILY
Qty: 60 TABLET | Refills: 0 | Status: SHIPPED | OUTPATIENT
Start: 2022-09-26 | End: 2022-10-26

## 2022-09-26 RX ORDER — DOCUSATE SODIUM 100 MG/1
100 CAPSULE, LIQUID FILLED ORAL 2 TIMES DAILY
Status: DISCONTINUED | OUTPATIENT
Start: 2022-09-26 | End: 2022-09-27 | Stop reason: HOSPADM

## 2022-09-26 RX ORDER — PALIPERIDONE 3 MG/1
3 TABLET, EXTENDED RELEASE ORAL DAILY
Qty: 3 TABLET | Refills: 0 | Status: SHIPPED | OUTPATIENT
Start: 2022-09-27 | End: 2022-09-30

## 2022-09-26 RX ORDER — PRAZOSIN HYDROCHLORIDE 1 MG/1
1 CAPSULE ORAL 2 TIMES DAILY
Qty: 60 CAPSULE | Refills: 0 | Status: SHIPPED | OUTPATIENT
Start: 2022-09-26

## 2022-09-26 RX ADMIN — HYDROXYZINE HYDROCHLORIDE 100 MG: 50 TABLET, FILM COATED ORAL at 10:01

## 2022-09-26 RX ADMIN — CHOLECALCIFEROL TAB 25 MCG (1000 UNIT) 1000 UNITS: 25 TAB at 08:26

## 2022-09-26 RX ADMIN — BUSPIRONE HYDROCHLORIDE 10 MG: 10 TABLET ORAL at 17:21

## 2022-09-26 RX ADMIN — PANTOPRAZOLE SODIUM 40 MG: 40 TABLET, DELAYED RELEASE ORAL at 08:27

## 2022-09-26 RX ADMIN — DOCUSATE SODIUM 100 MG: 100 CAPSULE, LIQUID FILLED ORAL at 17:22

## 2022-09-26 RX ADMIN — PALIPERIDONE 6 MG: 6 TABLET, EXTENDED RELEASE ORAL at 08:26

## 2022-09-26 RX ADMIN — PRAZOSIN HYDROCHLORIDE 1 MG: 1 CAPSULE ORAL at 08:26

## 2022-09-26 RX ADMIN — HALOPERIDOL 5 MG: 5 TABLET ORAL at 15:59

## 2022-09-26 RX ADMIN — BENZTROPINE MESYLATE 1 MG: 1 TABLET ORAL at 17:21

## 2022-09-26 RX ADMIN — BENZTROPINE MESYLATE 1 MG: 1 TABLET ORAL at 08:26

## 2022-09-26 RX ADMIN — TRAZODONE HYDROCHLORIDE 50 MG: 50 TABLET ORAL at 20:07

## 2022-09-26 RX ADMIN — SULFAMETHOXAZOLE AND TRIMETHOPRIM 1 TABLET: 800; 160 TABLET ORAL at 20:07

## 2022-09-26 RX ADMIN — TOPIRAMATE 25 MG: 25 TABLET, FILM COATED ORAL at 08:26

## 2022-09-26 RX ADMIN — TOPIRAMATE 25 MG: 25 TABLET, FILM COATED ORAL at 17:21

## 2022-09-26 RX ADMIN — SULFAMETHOXAZOLE AND TRIMETHOPRIM 1 TABLET: 800; 160 TABLET ORAL at 08:26

## 2022-09-26 RX ADMIN — DOCUSATE SODIUM 100 MG: 100 CAPSULE, LIQUID FILLED ORAL at 08:26

## 2022-09-26 RX ADMIN — BUSPIRONE HYDROCHLORIDE 10 MG: 10 TABLET ORAL at 08:26

## 2022-09-26 NOTE — BH TRANSITION RECORD
Contact Information: If you have any questions, concerns, pended studies, tests and/or procedures, or emergencies regarding your inpatient behavioral health visit  Please contact Graciela Méndez" [facility] behavioral health unit [phone] and ask to speak to a , nurse or physician  A contact is available 24 hours/ 7 days a week at this number  Summary of Procedures Performed During your Stay:  Below is a list of major procedures performed during your hospital stay and a summary of results:  - No major procedures performed  Pending Studies (From admission, onward)    None        Please follow up on the above pending studies with your PCP and/or referring provider

## 2022-09-26 NOTE — PROGRESS NOTES
09/26/22   Team Meeting   Meeting Type Daily Rounds   Team Members Present   Team Members Present Physician;Nurse;   Physician Team Member Dr Angelika Campbell MD; Dr Renae Barlow DO; [place], [place]   Nursing Team Member Anat Moeller RN   Social Work Team Member Jamar Foy, [place]   Patient/Family Present   Patient Present No   Patient's Family Present No     Less paranoid, less intrusive, more controlled, better insight  D/c tomorrow

## 2022-09-26 NOTE — PROGRESS NOTES
Progress Note - [place] [address] 46 y o  female MRN: [id number]   Unit/Bed#: [facility] 218-02 Encounter: [id number]    Behavior over the last 24 hours: improved  Candice seen today in office  Per staff report, pt remains social on unit  She continues to show improvement with coping skills  She received the second initiation dose of IM Invega yesterday  Patient is in good spirits this morning  She is brighter and appears less anxious  She reports improved mood with decreased anxiety at 6/10 and denies any depression  She reports good sleep without nightmares  She denies AVH today  She denies racing or negative thoughts  She reports decreased hypersensitivity to sound and appears to be better able to cope with triggers  She denies any SI/HI or any thoughts/urges of self-harm  She states she is excited to return home to her boyfriend and new dog  She has improved insight and judgement      Sleep: normal, no nightmares  Appetite: normal  Medication side effects: Constipation      Mental Status Evaluation:    Appearance:  age appropriate, casually dressed, adequate grooming, well-nourished   Behavior:  pleasant, cooperative, calm, less hypersensitive to sounds, less easily triggered   Speech:  average rate and volume   Mood:  improved, "Excited"   Affect:  brighter, full, appropriate   Thought Process:  linear   Associations: intact associations   Thought Content:  no overt delusions   Perceptual Disturbances: denies auditory hallucinations when asked, denies visual hallucinations when asked   Risk Potential: Suicidal ideation - None at present, contracts for safety on the unit, would talk to staff if not feeling safe on the unit  Homicidal ideation - None at present, no plan   Sensorium:  oriented to person, place and time/date         Memory:  recent and remote memory grossly intact      Consciousness:  alert and awake      Attention: attention span and concentration are age appropriate      Insight: improved      Judgment: improved      Gait/Station: normal gait/station      Motor Activity: no abnormal movements     Vital signs in last 24 hours:    Temp:  [97 6 °F (36 4 °C)-97 9 °F (36 6 °C)] 97 9 °F (36 6 °C)  HR:  [92-94] 92  Resp:  [16-18] 18  BP: ()/(67-71) 116/71    Laboratory results:   I have personally reviewed all pertinent laboratory/tests results  Most Recent Labs:   Lab Results   Component Value Date    WBC 12 74 (H) 09/15/2022    RBC 4 87 09/15/2022    HGB 14 0 09/15/2022    HCT 44 6 09/15/2022     09/15/2022    RDW 13 7 09/15/2022    NEUTROABS 6 43 09/15/2022    SODIUM 138 09/15/2022    K 3 7 09/15/2022     09/15/2022    CO2 27 09/15/2022    BUN 13 09/15/2022    CREATININE 0 93 09/15/2022    GLUC 78 09/15/2022    GLUF 78 09/15/2022    CALCIUM 9 3 09/15/2022    AST 25 09/15/2022    ALT 8 09/15/2022    ALKPHOS 67 09/15/2022    TP 7 3 09/15/2022    ALB 4 1 09/15/2022    TBILI 0 56 09/15/2022    CHOLESTEROL 195 09/15/2022    HDL 41 (L) 09/15/2022    TRIG 87 09/15/2022    LDLCALC 137 (H) 09/15/2022    [place] 154 09/15/2022    [id number] 3 023 09/15/2022    PREGUR negative 09/11/2022         Assessment/Plan   Principal Problem:    Bipolar 1 disorder ([facility] [address] )    Recommended Treatment:     Planned medication and treatment changes:  Continue Buspar 5 mg BID  Continue Prazosin 1 mg BID  Received first initiation dose [place] injection 234 mg IM on 09/20  Received second initiation dose of Invega Sustenna 156 mg IM on 9/25  Decrease Invega to 3 mg daily for 3 days, then discontinue  Continue Cogentin 1 mg BID  Discontinue Zyprexa 5 mg QHS  Patient is anticipated for discharge tomorrow      All current active medications have been reviewed  Encourage group therapy, milieu therapy and occupational therapy  Continue treatment with group therapy, milieu therapy and occupational therapy  Behavioral Health checks every 7 minutes  Current Facility-Administered Medications   Medication Dose Route Frequency Provider Last Rate    acetaminophen  650 mg Oral Q6H PRN Naaman Anthony, DO      acetaminophen  650 mg Oral Q4H PRN Naaman Anhtony, DO      acetaminophen  975 mg Oral Q6H PRN Emilia Kalaga, DO      albuterol  2 puff Inhalation Q4H PRN Renetta Baker PA-C      aluminum-magnesium hydroxide-simethicone  30 mL Oral Q4H PRN Emilia Kalaga, DO      benztropine  1 mg Intramuscular Q4H PRN Max 6/day Naaman Anthony, DO      benztropine  1 mg Oral Q4H PRN Max 6/day Naaman Anthony, DO      benztropine  1 mg Oral BID Ana Patel MD      busPIRone  10 mg Oral BID DANISHA Gutiérrez      cholecalciferol  1,000 Units Oral Daily RenettaRONA Aguilera-C      hydrOXYzine HCL  50 mg Oral Q6H PRN Max 4/day Emilia Kalaga, DO      Or    diphenhydrAMINE  50 mg Intramuscular Q6H PRN Emilia Kalaga, DO      docusate sodium  100 mg Oral Daily Emilia Kalaga, DO      haloperidol  5 mg Oral Daily PRN Cathstarre Lacrosse Medfaiza, BRIANNANP      hydrOXYzine HCL  100 mg Oral Q6H PRN Max 4/day Emilia Kalaga, DO      Or    LORazepam  2 mg Intramuscular Q6H PRN Emilia Kalaga, DO      hydrOXYzine HCL  25 mg Oral Q6H PRN Max 4/day Emilia Kalaga, DO      LORazepam  0 5 mg Oral Q6H PRN Emilia Kalaga, DO      OLANZapine  10 mg Oral Q3H PRN Max 3/day Emilia Kalaga, DO      Or    OLANZapine  10 mg Intramuscular Q3H PRN Max 3/day Emilia Kalaga, DO      OLANZapine  5 mg Oral Q3H PRN Max 6/day Naaman Anthony, DO      Or    OLANZapine  5 mg Intramuscular Q3H PRN Max 6/day Emilia Kalaga, DO      OLANZapine  2 5 mg Oral Q3H PRN Max 8/day Emilia Kalaga, DO      paliperidone  6 mg Oral Daily Nataly MeansDANISHA      pantoprazole  40 mg Oral Early Morning Corrine Cortés MD      polyethylene glycol  17 g Oral Daily PRN Naaman Anthony, DO      prazosin  1 mg Oral BID Emilia Kalaga, DO      senna-docusate sodium  1 tablet Oral Daily PRN Emilia Norris DO      sorbitol  30 mL Oral Q1H PRN Renetta Baker PA-C      sulfamethoxazole-trimethoprim  1 tablet Oral Q12H [address] Corrine Cortés MD      topiramate  25 mg Oral BID Armand Storey MD      traZODone  50 mg Oral HS PRN Manjula Cruz DO         Risks / Benefits of Treatment:    Risks, benefits, and possible side effects of medications explained to patient and patient verbalizes understanding and agreement for treatment  Counseling / Coordination of Care: Total floor / unit time spent today 25 minutes  Greater than 50% of total time was spent with the patient and / or family counseling and / or coordination of care  A description of counseling / coordination of care:  Patient's progress discussed with staff in treatment team meeting  Medications, treatment progress and treatment plan reviewed with patient      Manjula Cruz DO 09/26/22
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
## 2022-09-26 NOTE — NURSING NOTE
Patient has been visible on the unit  Interacting appropriately with both peers and staff  Denies s/i and depression  States, "if anything I feel more manic-y" She does report a high level of anxiety but says she has been handling the anxiety with coping skills  Identifies walking the halls, talking with other patients, listening to music, and filing her nails as healthy ways she has been coping  She says her AH "are at 50%" since being on her current medications  She says for the most part she is able to tune them out and when she cannot she uses ear plugs    Insight improving

## 2022-09-26 NOTE — DISCHARGE INSTR - AVS FIRST PAGE
Please come to [facility] located at [place], [place], [address] on [facility] 10/24/2022 @ 11:30am to receive your next [place] injection  Please attend your zoom virtual appointment for outpatient psych medication management with Vanessa Winchester on [address] [place] [address] 9/30/22 @ 2:00pm with Corinne; they will text you the link via phone

## 2022-09-26 NOTE — SOCIAL WORK
Prashanth Rudolph (boyfriend) [phone] (ESHA signed)  Tx and d/c planning, meds discussed  Joseph Slaughter to  pt tomorrow at [address] agreeable to [employer] as well  Call ended mutually

## 2022-09-26 NOTE — PROGRESS NOTES
09/26/22 0730   Activity/Group Checklist   Group   (Patient morning check in with Covid Precautions)   Attendance Attended   Attendance Duration (min) 31-45   Interactions Interacted appropriately   Affect/Mood Appropriate   Goals Achieved Identified feelings; Discussed coping strategies; Able to listen to others; Able to engage in interactions; Able to reflect/comment on own behavior;Able to manage/cope with feelings

## 2022-09-26 NOTE — NURSING NOTE
Pt requested something for anxiety after an alarm was set off on unit patient stated it caused her some anxiety  Pt was visibly shaking and appeared to be distracted and was frequently scanning her soundings with eyes   So gave patient 100mg atarax for velasco of 27  will monitor for effectiveness

## 2022-09-26 NOTE — SOCIAL WORK
Pt's Lori Lam called to discuss tx and d/c planning  Sw communicated there is no signed ESHA on file to be found and therefore sw cannot speak with him due to [place]  Whitney Umana was upset over this  Sw ended the call

## 2022-09-26 NOTE — SOCIAL WORK
[facility] [phone] new pt appt on10/30 at 10:30am with [place] (scheudled with Du Rodriguez)

## 2022-09-26 NOTE — PROGRESS NOTES
Progress Note - Mark Mccullough 46 y o  female MRN: [id number]    Unit/Bed#: [facility] 218-02 Encounter: [id number]        Subjective:   Patient seen and examined at bedside after reviewing the chart and discussing the case with the caring staff  Patient examined at bedside  Patient reports no acute symptoms except acid reflux  Patient is scheduled for discharge tomorrow at 10:00 a m  Luciano Lucina Patient Solo Jiang is requesting all her prescriptions  I reviewed and reconciled patient's problem list and medications  Physical Exam   Vitals: Blood pressure 116/71, pulse 92, temperature 97 9 °F (36 6 °C), temperature source Temporal, resp  rate 18, height 5' 5" (1 651 m), weight 57 5 kg (126 lb 12 8 oz), SpO2 100 %  ,Body mass index is 21 1 kg/m²  Constitutional: Patient in no acute distress  HEENT: PERR, EOMI, MMM  Cardiovascular: Normal rate and regular rhythm  Pulmonary/Chest: Effort normal and breath sounds normal    Abdomen:  Nondistended, +BS, soft, NT, no rigidity, no guarding, no rebound  Assessment/Plan:  Mark Mccullough is a(n) 46 y o  female with bipolar disorder  MEDICAL CLEARANCE  Patient is medically cleared for discharge  All scripts will be sent out for the patient      1  Migraines  Patient is on Topamax 25 mg twice daily  2  Arthralgias  Patient may take Tylenol as needed  3  Psoriasis  Stable  4  Asthma  Patient may use albuterol inhaler as needed  5  Vitamin D deficiency  Patient started on vitamin D3 1000 units daily  6  Right breast abscess/mastitis  Improving  Patient started on Bactrim DS for 10 days  Wound culture on 09/17 negative  7  Constipation  Patient is on Colace 100 mg daily, Senokot S as needed, MiraLax as needed  Will start patient on sorbitol every hour until BM  8  GERD  I will put the patient on Protonix 40 mg daily along with  Mylanta as needed

## 2022-09-26 NOTE — NURSING NOTE
Patient compliant with meds and meals  Denies all but anxiety  Pt is pleasant and cooperative, social and visible  Attends groups throughout the day  Pt had requested something for anxiety again in the afternoon but encouraged pt to use coping skills, pt attempted and stated she was writing in her journal, using the tablet and doing some arts and crafts  Pt stated these were ineffective and was given haldol 5mg at 1559 which was effective pt agitation score was 34  Pt felt bad that she couldn't fully cope with out the use of meds but I encouraged pt that she did really well and it will take time for her to build up h er coping skills and to just keep trying till she finds something that works the best for her   Q 7 min behavioral and safety checks in place

## 2022-09-26 NOTE — SOCIAL WORK
Sw met with pt for daily check in  Pt denies SI/HI/dep/anx  Pt endorses noncomand AH  Pt is d/c focused and is agreeable to get DALEY at [facility], if possible  Pt is planning to move near [facility] soon  Pt agreeable to [facility] follow up  Pt presents alert, orientated, cooperative, pleasant

## 2022-09-26 NOTE — SOCIAL WORK
[place] [phone] states office fax is [phone] The [facility] office has CRNP for Invega injection or pt will have upcoming [address]  At [facility] location  Erica Dunn assured sw that pt will be able to have [address] through [facility]

## 2022-09-26 NOTE — PLAN OF CARE
Problem: Alteration in Thoughts and Perception  Goal: Verbalize thoughts and feelings  Description: Interventions:  - Promote a nonjudgmental and trusting relationship with the patient through active listening and therapeutic communication  - Assess patient's level of functioning, behavior and potential for risk  - Engage patient in 1 on 1 interactions  - Encourage patient to express fears, feelings, frustrations, and discuss symptoms    - [place] patient to reality, help patient recognize reality-based thinking   - Administer medications as ordered and assess for potential side effects  - Provide the patient education related to the signs and symptoms of the illness and desired effects of prescribed medications  Outcome: Progressing  Goal: Agree to be compliant with medication regime, as prescribed and report medication side effects  Description: Interventions:  - Offer appropriate PRN medication and supervise ingestion; conduct AIMS, as needed   Outcome: Progressing  Goal: Attend and participate in unit activities, including therapeutic, recreational, and educational groups  Description: Interventions:  -Encourage Visitation and family involvement in care  Outcome: Progressing  Goal: Recognize dysfunctional thoughts, communicate reality-based thoughts at the time of discharge  Description: Interventions:  - Provide medication and psycho-education to assist patient in compliance and developing insight into his/her illness   Outcome: Progressing     Problem: Ineffective Coping  Goal: Identifies ineffective coping skills  Outcome: Progressing  Goal: Identifies healthy coping skills  Outcome: Progressing  Goal: Demonstrates healthy coping skills  Outcome: Progressing     Problem: Risk for Self Injury/Neglect  Goal: Treatment Goal: Remain safe during length of stay, learn and adopt new coping skills, and be free of self-injurious ideation, impulses and acts at the time of discharge  Outcome: Progressing  Goal: Refrain from harming self  Description: Interventions:  - Monitor patient closely, per order  - Develop a trusting relationship  - Supervise medication ingestion, monitor effects and side effects   Outcome: Progressing  Goal: Recognize maladaptive responses and adopt new coping mechanisms  Outcome: Progressing  Goal: Complete daily ADLs, including personal hygiene independently, as able  Description: Interventions:  - Observe, teach, and assist patient with ADLS  - Monitor and promote a balance of rest/activity, with adequate nutrition and elimination  Outcome: Progressing     Problem: Depression  Goal: Refrain from isolation  Description: Interventions:  - Develop a trusting relationship   - Encourage socialization   Outcome: Progressing  Goal: Refrain from self-neglect  Outcome: Progressing     Problem: Anxiety  Goal: Anxiety is at manageable level  Description: Interventions:  - Assess and monitor patient's anxiety level  - Monitor for signs and symptoms (heart palpitations, chest pain, shortness of breath, headaches, nausea, feeling jumpy, restlessness, irritable, apprehensive)  - Collaborate with interdisciplinary team and initiate plan and interventions as ordered    - [place] patient to unit/surroundings  - Explain treatment plan  - Encourage participation in care  - Encourage verbalization of concerns/fears  - Identify coping mechanisms  - Assist in developing anxiety-reducing skills  - Administer/offer alternative therapies  - Limit or eliminate stimulants  Outcome: Progressing     Problem: Ineffective Coping  Goal: Participates in unit activities  Description: Interventions:  - Provide therapeutic environment   - Provide required programming   - Redirect inappropriate behaviors   Outcome: Progressing   See chart note

## 2022-09-26 NOTE — PROGRESS NOTES
09/26/22 1030   Activity/Group Checklist   Group   (Album Cover of Life Art Therapy Processing)   Attendance Attended   Attendance Duration (min) Greater than 60   Interactions Interacted appropriately   Affect/Mood Appropriate;Bright;Calm   Goals Achieved Identified feelings; Discussed coping strategies; Identified relapse prevention strategies; Increased hopefulness; Identified resources and support systems; Able to listen to others; Able to engage in interactions; Able to reflect/comment on own behavior;Able to manage/cope with feelings

## 2022-09-27 VITALS
TEMPERATURE: 97.5 F | OXYGEN SATURATION: 93 % | RESPIRATION RATE: 18 BRPM | HEIGHT: 65 IN | SYSTOLIC BLOOD PRESSURE: 104 MMHG | DIASTOLIC BLOOD PRESSURE: 66 MMHG | HEART RATE: 82 BPM | WEIGHT: 126.8 LBS | BODY MASS INDEX: 21.13 KG/M2

## 2022-09-27 PROCEDURE — 99238 HOSP IP/OBS DSCHRG MGMT 30/<: CPT | Performed by: PSYCHIATRY & NEUROLOGY

## 2022-09-27 RX ORDER — BENZTROPINE MESYLATE 1 MG/1
1 TABLET ORAL 2 TIMES DAILY
Qty: 60 TABLET | Refills: 0 | Status: SHIPPED | OUTPATIENT
Start: 2022-09-27 | End: 2022-10-27

## 2022-09-27 RX ADMIN — PALIPERIDONE 3 MG: 3 TABLET, EXTENDED RELEASE ORAL at 08:00

## 2022-09-27 RX ADMIN — PRAZOSIN HYDROCHLORIDE 1 MG: 1 CAPSULE ORAL at 08:00

## 2022-09-27 RX ADMIN — CHOLECALCIFEROL TAB 25 MCG (1000 UNIT) 1000 UNITS: 25 TAB at 08:00

## 2022-09-27 RX ADMIN — BENZTROPINE MESYLATE 1 MG: 1 TABLET ORAL at 08:00

## 2022-09-27 RX ADMIN — BUSPIRONE HYDROCHLORIDE 10 MG: 10 TABLET ORAL at 08:00

## 2022-09-27 RX ADMIN — DOCUSATE SODIUM 100 MG: 100 CAPSULE, LIQUID FILLED ORAL at 08:00

## 2022-09-27 RX ADMIN — TOPIRAMATE 25 MG: 25 TABLET, FILM COATED ORAL at 08:00

## 2022-09-27 RX ADMIN — PANTOPRAZOLE SODIUM 40 MG: 40 TABLET, DELAYED RELEASE ORAL at 05:53

## 2022-09-27 RX ADMIN — SULFAMETHOXAZOLE AND TRIMETHOPRIM 1 TABLET: 800; 160 TABLET ORAL at 08:00

## 2022-09-27 NOTE — PROGRESS NOTES
09/27/22   Team Meeting   Meeting Type Tx Team Meeting   Team Members Present   Team Members Present Physician;Nurse;   Physician Team Member Dr Arvind Rey MD; Dr Yessy Gonzalez, DO; HOSP DR ABDIEL ADAMS, [address] [place] [address]   Nursing Team Member Jasmina Shore, RN   Social Work Team Member Dorys Garner, [facility]   Patient/Family Present   Patient Present No   Patient's Family Present No     D/c today

## 2022-09-27 NOTE — NURSING NOTE
Patient escorted to the main lobby with all belongings  All discharge paperwork reviewed with patient  Patient denied SI/HI  Patient appropriate in mood and affect

## 2022-09-27 NOTE — PLAN OF CARE
Problem: Alteration in Thoughts and Perception  Goal: Verbalize thoughts and feelings  Description: Interventions:  - Promote a nonjudgmental and trusting relationship with the patient through active listening and therapeutic communication  - Assess patient's level of functioning, behavior and potential for risk  - Engage patient in 1 on 1 interactions  - Encourage patient to express fears, feelings, frustrations, and discuss symptoms    - [place] patient to reality, help patient recognize reality-based thinking   - Administer medications as ordered and assess for potential side effects  - Provide the patient education related to the signs and symptoms of the illness and desired effects of prescribed medications  Outcome: Progressing  Goal: Agree to be compliant with medication regime, as prescribed and report medication side effects  Description: Interventions:  - Offer appropriate PRN medication and supervise ingestion; conduct AIMS, as needed   Outcome: Progressing  Goal: Attend and participate in unit activities, including therapeutic, recreational, and educational groups  Description: Interventions:  -Encourage Visitation and family involvement in care  Outcome: Progressing  Goal: Recognize dysfunctional thoughts, communicate reality-based thoughts at the time of discharge  Description: Interventions:  - Provide medication and psycho-education to assist patient in compliance and developing insight into his/her illness   Outcome: Progressing     Problem: Ineffective Coping  Goal: Identifies ineffective coping skills  Outcome: Progressing  Goal: Identifies healthy coping skills  Outcome: Progressing  Goal: Demonstrates healthy coping skills  Outcome: Progressing     Problem: Risk for Self Injury/Neglect  Goal: Treatment Goal: Remain safe during length of stay, learn and adopt new coping skills, and be free of self-injurious ideation, impulses and acts at the time of discharge  Outcome: Progressing  Goal: Refrain from harming self  Description: Interventions:  - Monitor patient closely, per order  - Develop a trusting relationship  - Supervise medication ingestion, monitor effects and side effects   Outcome: Progressing  Goal: Recognize maladaptive responses and adopt new coping mechanisms  Outcome: Progressing  Goal: Complete daily ADLs, including personal hygiene independently, as able  Description: Interventions:  - Observe, teach, and assist patient with ADLS  - Monitor and promote a balance of rest/activity, with adequate nutrition and elimination  Outcome: Progressing     Problem: Depression  Goal: Refrain from isolation  Description: Interventions:  - Develop a trusting relationship   - Encourage socialization   Outcome: Progressing  Goal: Refrain from self-neglect  Outcome: Progressing     Problem: Anxiety  Goal: Anxiety is at manageable level  Description: Interventions:  - Assess and monitor patient's anxiety level  - Monitor for signs and symptoms (heart palpitations, chest pain, shortness of breath, headaches, nausea, feeling jumpy, restlessness, irritable, apprehensive)  - Collaborate with interdisciplinary team and initiate plan and interventions as ordered    - [place] patient to unit/surroundings  - Explain treatment plan  - Encourage participation in care  - Encourage verbalization of concerns/fears  - Identify coping mechanisms  - Assist in developing anxiety-reducing skills  - Administer/offer alternative therapies  - Limit or eliminate stimulants  Outcome: Progressing     Problem: Ineffective Coping  Goal: Participates in unit activities  Description: Interventions:  - Provide therapeutic environment   - Provide required programming   - Redirect inappropriate behaviors   Outcome: Progressing     Problem: DISCHARGE PLANNING - CARE MANAGEMENT  Goal: Discharge to post-acute care or home with appropriate resources  Description: INTERVENTIONS:  - Conduct assessment to determine patient/family and health care team treatment goals, and need for post-acute services based on payer coverage, community resources, and patient preferences, and barriers to discharge  - Address psychosocial, clinical, and financial barriers to discharge as identified in assessment in conjunction with the patient/family and health care team  - Arrange appropriate level of post-acute services according to patients   needs and preference and payer coverage in collaboration with the physician and health care team  - Communicate with and update the patient/family, physician, and health care team regarding progress on the discharge plan  - Arrange appropriate transportation to post-acute venues  Outcome: Progressing   See chart nore

## 2022-09-27 NOTE — NURSING NOTE
Patient has been visible on the unit  Interacting with peers  Very eager for dc  Says her boyfriend will be picking her up but she has already prepared herself for the fact that they may still have issues in which case she intends to leave him  She has a back up plan if needed  Also says she is looking forward to meeting her new dog

## 2022-09-27 NOTE — PROGRESS NOTES
Pt is leaving with the following items  Candy pads    1 green shirt 1 pair of socks 4 pair of undies 1 gray shirt 1 sport bra 1 tan long sleeve 2 pair of pants 3 coloring books sweatshirt sweat pants x 3 bra long sleeve t shirt x 3 shirt shoes    Storeage  5 socks 1 black t shirt 1 dove deoderant

## 2022-09-27 NOTE — PROGRESS NOTES
Progress Note - Linsey Morris 46 y o  female MRN: [id number]    Unit/Bed#: [facility] 218-02 Encounter: [id number]        Subjective:   Patient seen and examined at bedside after reviewing the chart and discussing the case with the caring staff  Patient examined at bedside  Patient reports no acute symptoms except acid reflux  Patient is scheduled for discharge today at 10:00 a m  Karo Hamilton Patient is requesting all her prescriptions  I reviewed and reconciled patient's problem list and medications  Physical Exam   Vitals: Blood pressure 104/66, pulse 82, temperature 97 5 °F (36 4 °C), temperature source Temporal, resp  rate 18, height 5' 5" (1 651 m), weight 57 5 kg (126 lb 12 8 oz), SpO2 93 %  ,Body mass index is 21 1 kg/m²  Constitutional: Patient in no acute distress  HEENT: PERR, EOMI, MMM  Cardiovascular: Normal rate and regular rhythm  Pulmonary/Chest: Effort normal and breath sounds normal    Abdomen:  Nondistended, +BS, soft, NT, no rigidity, no guarding, no rebound  Assessment/Plan:  Linsey Morris is a(n) 46 y o  female with bipolar disorder  MEDICAL CLEARANCE  Patient is medically cleared for discharge  All scripts will be sent out for the patient      1  Migraines  Patient is on Topamax 25 mg twice daily  2  Arthralgias  Patient may take Tylenol as needed  3  Psoriasis  Stable  4  Asthma  Patient may use albuterol inhaler as needed  5  Vitamin D deficiency  Patient started on vitamin D3 1000 units daily  6  Right breast abscess/mastitis  Improving  Patient started on Bactrim DS for 10 days  Wound culture on 09/17 negative  7  Constipation  Patient is on Colace 100 mg daily, Senokot S as needed, MiraLax as needed  Will start patient on sorbitol every hour until BM  8  GERD  I will put the patient on Protonix 40 mg daily along with  Mylanta as needed

## 2022-09-28 ENCOUNTER — HOSPITAL ENCOUNTER (EMERGENCY)
Facility: HOSPITAL | Age: 42
Discharge: HOME | End: 2022-09-28
Attending: EMERGENCY MEDICINE
Payer: COMMERCIAL

## 2022-09-28 VITALS
BODY MASS INDEX: 20.99 KG/M2 | WEIGHT: 126 LBS | SYSTOLIC BLOOD PRESSURE: 142 MMHG | RESPIRATION RATE: 16 BRPM | TEMPERATURE: 97.9 F | HEART RATE: 98 BPM | HEIGHT: 65 IN | DIASTOLIC BLOOD PRESSURE: 86 MMHG | OXYGEN SATURATION: 100 %

## 2022-09-28 DIAGNOSIS — Z02.83 ENCOUNTER FOR DRUG SCREENING: Primary | ICD-10-CM

## 2022-09-28 LAB
AMPHETAMINES SERPL QL SCN: NEGATIVE
BARBITURATES UR QL: NEGATIVE
BENZODIAZ UR QL: NEGATIVE
COCAINE UR QL: NEGATIVE
METHADONE UR QL: NEGATIVE
OPIATES UR QL SCN: NEGATIVE
OXYCODONE+OXYMORPHONE UR QL SCN: NEGATIVE
PCP UR QL: NEGATIVE
THC UR QL: POSITIVE

## 2022-09-28 PROCEDURE — 99281 EMR DPT VST MAYX REQ PHY/QHP: CPT | Performed by: EMERGENCY MEDICINE

## 2022-09-28 PROCEDURE — 99281 EMR DPT VST MAYX REQ PHY/QHP: CPT

## 2022-09-28 PROCEDURE — 80307 DRUG TEST PRSMV CHEM ANLYZR: CPT | Performed by: EMERGENCY MEDICINE

## 2022-09-28 NOTE — ED PROVIDER NOTES
History  Chief Complaint   Patient presents with    Labs Only     Pt states she had a urine test today that was positive for benzos and fentanyl  Pt states she does not use any drugs and would like a repeat urine test and blood work     Patient is a 31-year-old female who presents the emergency department requesting a urine drug screen be done on her immediately because she took a recent urine drug screen which tested positive and she believes this to have been a false positive  Patient has no acute medical symptoms  History provided by:  Patient      Cannot display prior to admission medications because the patient has not been admitted in this contact  Past Medical History:   Diagnosis Date    Asthma     Psychiatric disorder        Past Surgical History:   Procedure Laterality Date    TUBAL LIGATION         History reviewed  No pertinent family history  I have reviewed and agree with the history as documented  E-Cigarette/Vaping    E-Cigarette Use Never User      E-Cigarette/Vaping Substances    Nicotine No     THC No     CBD No     Flavoring No     Other No     Unknown No      Social History     Tobacco Use    Smoking status: Never Smoker    Smokeless tobacco: Never Used   Vaping Use    Vaping Use: Never used   Substance Use Topics    Alcohol use: Yes    Drug use: Yes     Types: Marijuana       Review of Systems   Constitutional: Negative for activity change, appetite change, chills, fatigue and fever  HENT: Negative for congestion, ear pain, rhinorrhea and sore throat  Eyes: Negative for discharge, redness and visual disturbance  Respiratory: Negative for cough, chest tightness, shortness of breath and wheezing  Cardiovascular: Negative for chest pain and palpitations  Gastrointestinal: Negative for abdominal pain, constipation, diarrhea, nausea and vomiting  Endocrine: Negative for polydipsia and polyuria     Genitourinary: Negative for difficulty urinating, dysuria, frequency, hematuria and urgency  Musculoskeletal: Negative for arthralgias and myalgias  Skin: Negative for color change, pallor and rash  Neurological: Negative for dizziness, weakness, light-headedness, numbness and headaches  Hematological: Negative for adenopathy  Does not bruise/bleed easily  All other systems reviewed and are negative  Physical Exam  Physical Exam  Vitals and nursing note reviewed  Constitutional:       Appearance: She is well-developed  She is not ill-appearing  HENT:      Head: Normocephalic and atraumatic  Right Ear: External ear normal       Left Ear: External ear normal       Nose: Nose normal    Eyes:      Conjunctiva/sclera: Conjunctivae normal       Pupils: Pupils are equal, round, and reactive to light  Cardiovascular:      Rate and Rhythm: Normal rate  Pulmonary:      Effort: Pulmonary effort is normal  No respiratory distress  Musculoskeletal:         General: Normal range of motion  Cervical back: Normal range of motion  Skin:     General: Skin is warm and dry  Neurological:      Mental Status: She is alert and oriented to person, place, and time  Cranial Nerves: No cranial nerve deficit  Sensory: No sensory deficit           Vital Signs  ED Triage Vitals [09/28/22 1507]   Temperature Pulse Respirations Blood Pressure SpO2   97 9 °F (36 6 °C) 98 16 142/86 100 %      Temp Source Heart Rate Source Patient Position - Orthostatic VS BP Location FiO2 (%)   Temporal Monitor Sitting Left arm --      Pain Score       --           Vitals:    09/28/22 1507   BP: 142/86   Pulse: 98   Patient Position - Orthostatic VS: Sitting         Visual Acuity      ED Medications  Medications - No data to display    Diagnostic Studies  Results Reviewed     Procedure Component Value Units Date/Time    Rapid drug screen, urine [[id number]] Collected: 09/28/22 2884    Lab Status: No result Specimen: Urine, Clean Catch                  No orders to display              Procedures  Procedures         ED Course                                             MDM  Number of Diagnoses or Management Options  Encounter for drug screening: new and requires workup  Diagnosis management comments: Patient advised that the urine drug screens provided in the ER for medical purposes only and are not chain of custody drug tests which would be likely to be required for her desired purposes  Patient still wishes to have urine drug screen done in the ED as she believes that this may help her with her current legal situation  Advised patient that I will run the drug screen but that it may not be considered valid for legal purposes she understands this and agrees and still wishes to have the drug test run  Advised to follow-up with primary care physician  Return precautions and anticipatory guidance discussed  Amount and/or Complexity of Data Reviewed  Clinical lab tests: ordered  Decide to obtain previous medical records or to obtain history from someone other than the patient: yes  Review and summarize past medical records: yes    Risk of Complications, Morbidity, and/or Mortality  Presenting problems: low  Diagnostic procedures: low  Management options: low    Patient Progress  Patient progress: stable      Disposition  Final diagnoses:   Encounter for drug screening     Time reflects when diagnosis was documented in both MDM as applicable and the Disposition within this note     Time User Action Codes Description Comment    9/28/2022  3:41 PM Tyra Smith Add [Z02 83] Encounter for drug screening       ED Disposition     ED Disposition   Discharge    Condition   Stable    Date/Time   Wed Sep 28, 2022  3:41 PM    Juan Antonio Spaulding discharge to home/self care                 Follow-up Information     Follow up With Specialties Details Why [address], DO Internal Medicine Schedule an appointment as soon as possible for a visit in 2 days  [address] [facility] [address] [facility]  [phone]            Patient's Medications   Discharge Prescriptions    No medications on file       No discharge procedures on file      PDMP Review     None          ED Provider  Electronically Signed by           Aaron Ward DO  09/28/22 5824

## 2022-09-28 NOTE — DISCHARGE INSTRUCTIONS
The drug screen provided to you in the ED is not a chain of custody drug screen and therefore may not be valid for your requested or desired purposes however it was provided to you at your request today

## 2022-09-29 DIAGNOSIS — F41.9 ANXIETY: Primary | ICD-10-CM

## 2022-09-29 RX ORDER — HYDROXYZINE HYDROCHLORIDE 25 MG/1
25 TABLET, FILM COATED ORAL 2 TIMES DAILY PRN
Qty: 60 TABLET | Refills: 0 | Status: SHIPPED | OUTPATIENT
Start: 2022-09-29 | End: 2022-10-29

## 2022-09-29 NOTE — PROGRESS NOTES
Pt called and asked to provide her Atrax PRN  She found Atarax useful to treat her anxiety while she was inpatient  Did not have side effects  Moderate dose of Atarax was provided

## 2022-10-24 ENCOUNTER — HOSPITAL ENCOUNTER (OUTPATIENT)
Dept: INFUSION CENTER | Facility: HOSPITAL | Age: 42
Discharge: HOME | End: 2022-10-24
Payer: COMMERCIAL

## 2022-10-24 VITALS — TEMPERATURE: 97.4 F

## 2022-10-24 DIAGNOSIS — F31.9 BIPOLAR 1 DISORDER (HCC): Primary | ICD-10-CM

## 2022-10-24 RX ORDER — FLUTICASONE PROPIONATE 110 UG/1
2 AEROSOL, METERED RESPIRATORY (INHALATION) 2 TIMES DAILY
COMMUNITY
Start: 2022-10-03

## 2022-10-24 RX ADMIN — PALIPERIDONE PALMITATE 156 MG: 156 INJECTION INTRAMUSCULAR at 11:38

## 2022-10-24 NOTE — PROGRESS NOTES
Patient tolerated Invega injection to left deltoid without reaction or issues  AVS given to patient  Patient ambulated off unit without incident  All personal belongings taken with patient

## 2022-11-21 ENCOUNTER — HOSPITAL ENCOUNTER (OUTPATIENT)
Dept: INFUSION CENTER | Facility: HOSPITAL | Age: 42
Discharge: HOME | End: 2022-11-21

## 2022-11-21 VITALS — TEMPERATURE: 97.2 F

## 2022-11-21 DIAGNOSIS — F31.9 BIPOLAR 1 DISORDER (HCC): Primary | ICD-10-CM

## 2022-11-21 RX ADMIN — PALIPERIDONE PALMITATE 156 MG: 156 INJECTION INTRAMUSCULAR at 11:35

## 2022-11-21 NOTE — PROGRESS NOTES
Patient tolerated invega sustenna injection to right deltoid without issue   Discharged in stable condition with AVS

## 2022-12-19 ENCOUNTER — HOSPITAL ENCOUNTER (OUTPATIENT)
Dept: INFUSION CENTER | Facility: HOSPITAL | Age: 42
Discharge: HOME | End: 2022-12-19

## 2022-12-19 VITALS — TEMPERATURE: 97.4 F

## 2022-12-19 DIAGNOSIS — F31.9 BIPOLAR 1 DISORDER (HCC): Primary | ICD-10-CM

## 2022-12-19 RX ADMIN — PALIPERIDONE PALMITATE 156 MG: 156 INJECTION INTRAMUSCULAR at 11:13

## 2022-12-19 NOTE — PROGRESS NOTES
Patient tolerated invega sustenna without incident  NEXT appointment made  AVS declined, she is aware of her next appointment

## 2023-01-16 ENCOUNTER — HOSPITAL ENCOUNTER (OUTPATIENT)
Dept: INFUSION CENTER | Facility: HOSPITAL | Age: 43
Discharge: HOME | End: 2023-01-16

## 2023-01-16 VITALS — TEMPERATURE: 97.4 F

## 2023-01-16 DIAGNOSIS — F31.9 BIPOLAR 1 DISORDER (HCC): Primary | ICD-10-CM

## 2023-01-16 RX ADMIN — PALIPERIDONE PALMITATE 156 MG: 156 INJECTION INTRAMUSCULAR at 11:12

## 2023-01-16 NOTE — PROGRESS NOTES
Pt offers no complaints  Tolerated injection well in right arm without incident  Discharged in stable condition and pt aware of next infusion appointment  AVS provided

## 2023-02-13 ENCOUNTER — HOSPITAL ENCOUNTER (OUTPATIENT)
Dept: INFUSION CENTER | Facility: HOSPITAL | Age: 43
Discharge: HOME | End: 2023-02-13

## 2023-02-13 VITALS — TEMPERATURE: 98 F

## 2023-02-13 DIAGNOSIS — F31.9 BIPOLAR 1 DISORDER (HCC): Primary | ICD-10-CM

## 2023-02-13 RX ORDER — NAPROXEN 500 MG/1
1 TABLET ORAL 2 TIMES DAILY PRN
COMMUNITY
Start: 2023-02-03

## 2023-02-13 RX ADMIN — PALIPERIDONE PALMITATE 156 MG: 156 INJECTION INTRAMUSCULAR at 11:36

## 2023-02-13 NOTE — PROGRESS NOTES
Patient tolerated Invega injection in right deltoid without reaction or issues  AVS given to patient  Patient ambulated off unit without incident  All personal belongings taken with patient

## 2023-03-13 ENCOUNTER — HOSPITAL ENCOUNTER (OUTPATIENT)
Dept: INFUSION CENTER | Facility: HOSPITAL | Age: 43
Discharge: HOME | End: 2023-03-13

## 2023-03-13 VITALS — TEMPERATURE: 96.8 F

## 2023-03-13 DIAGNOSIS — F31.9 BIPOLAR 1 DISORDER (HCC): Primary | ICD-10-CM

## 2023-03-13 RX ADMIN — PALIPERIDONE PALMITATE 156 MG: 156 INJECTION INTRAMUSCULAR at 11:08

## 2023-03-13 NOTE — PROGRESS NOTES
Patient tolerated Invega injection in right deltoid per request without reaction or issues  AVS given to patient  Patient ambulated off unit without incident  All personal belongings taken with patient  Sent message via [facility] text to Dr Manjula Mckeon to review plan for next treatment

## 2023-05-08 ENCOUNTER — HOSPITAL ENCOUNTER (OUTPATIENT)
Dept: INFUSION CENTER | Facility: HOSPITAL | Age: 43
Discharge: HOME | End: 2023-05-08
Attending: PSYCHIATRY & NEUROLOGY

## 2023-05-08 DIAGNOSIS — F31.9 BIPOLAR 1 DISORDER (HCC): Primary | ICD-10-CM

## 2023-05-08 RX ADMIN — PALIPERIDONE PALMITATE 156 MG: 156 INJECTION INTRAMUSCULAR at 12:33

## 2023-05-08 NOTE — PROGRESS NOTES
No complaints offered  Tolerated injection in Right arm without incident  Nect appt made, AVS declined

## 2023-06-05 ENCOUNTER — HOSPITAL ENCOUNTER (OUTPATIENT)
Dept: INFUSION CENTER | Facility: HOSPITAL | Age: 43
Discharge: HOME | End: 2023-06-05
Attending: PSYCHIATRY & NEUROLOGY
Payer: COMMERCIAL

## 2023-06-05 DIAGNOSIS — F31.9 BIPOLAR 1 DISORDER (HCC): Primary | ICD-10-CM

## 2023-06-05 RX ADMIN — PALIPERIDONE PALMITATE 156 MG: 156 INJECTION INTRAMUSCULAR at 12:14

## 2023-06-05 NOTE — PROGRESS NOTES
Pt arrived for invega sustenna injection  States that she has 2 bumps on her right arm from last month's injection  Two hard small lumps felt near deltoid muscle  Pt reports only small amount of pain on palpation and itchiness  Encouraged pt to call physician's office if symptoms worsen  Pt tolerated injection in left deltoid well without incident  Discharged in stable condition and next infusion appointment scheduled in 4 weeks  AVS provided

## 2023-07-03 ENCOUNTER — HOSPITAL ENCOUNTER (OUTPATIENT)
Dept: INFUSION CENTER | Facility: HOSPITAL | Age: 43
Discharge: HOME | End: 2023-07-03
Attending: PSYCHIATRY & NEUROLOGY
Payer: COMMERCIAL

## 2023-07-03 VITALS — TEMPERATURE: 97.6 F

## 2023-07-03 DIAGNOSIS — F31.9 BIPOLAR 1 DISORDER (HCC): Primary | ICD-10-CM

## 2023-07-03 RX ADMIN — PALIPERIDONE PALMITATE 156 MG: 156 INJECTION INTRAMUSCULAR at 12:34

## 2023-07-03 NOTE — PROGRESS NOTES
Patient tolerated Invega injection in left deltoid per request without reaction or issues. Reports that she still has two small "bumps" from previous injection sites to rt arm. This was addressed last month by pt as well. She has not reported it to MD office .  AVS given to patient. Karina Mcconnell ambulated off unit without incident.  All personal belongings taken with patient.

## 2023-07-31 ENCOUNTER — HOSPITAL ENCOUNTER (OUTPATIENT)
Dept: INFUSION CENTER | Facility: HOSPITAL | Age: 43
Discharge: HOME | End: 2023-07-31
Attending: PSYCHIATRY & NEUROLOGY
Payer: COMMERCIAL

## 2023-07-31 DIAGNOSIS — F31.9 BIPOLAR 1 DISORDER (HCC): Primary | ICD-10-CM

## 2023-07-31 PROCEDURE — 96372 THER/PROPH/DIAG INJ SC/IM: CPT

## 2023-07-31 RX ADMIN — PALIPERIDONE PALMITATE 156 MG: 156 INJECTION INTRAMUSCULAR at 14:20

## 2023-07-31 NOTE — PROGRESS NOTES
Patient tolerated aristada injection without reaction or issues. AVS given to patient. Patient ambulated off unit without incident. All personal belongings taken with patient.

## 2023-08-28 ENCOUNTER — HOSPITAL ENCOUNTER (OUTPATIENT)
Dept: INFUSION CENTER | Facility: HOSPITAL | Age: 43
Discharge: HOME | End: 2023-08-28
Attending: PSYCHIATRY & NEUROLOGY
Payer: COMMERCIAL

## 2023-08-28 DIAGNOSIS — F31.9 BIPOLAR 1 DISORDER (HCC): Primary | ICD-10-CM

## 2023-08-28 PROCEDURE — 96372 THER/PROPH/DIAG INJ SC/IM: CPT

## 2023-08-28 RX ADMIN — PALIPERIDONE PALMITATE 156 MG: 156 INJECTION INTRAMUSCULAR at 12:25

## 2023-08-28 NOTE — PROGRESS NOTES
Pt tolerated invega sustenna injection to left arm at her request. Next appt scheduled.  Discharged ambulatory deferring avs.

## 2023-09-25 ENCOUNTER — HOSPITAL ENCOUNTER (OUTPATIENT)
Dept: INFUSION CENTER | Facility: HOSPITAL | Age: 43
Discharge: HOME | End: 2023-09-25
Attending: PSYCHIATRY & NEUROLOGY

## 2023-09-25 NOTE — PROGRESS NOTES
Tc from Rey Crain at dr Edel Rios office asking what in particular we need for todays appt. The plan needs to be redated and also reviewed by a provider. They do not have a provider in the office today but do tomorrow. Office will reach out to pt and cancel her for today. Rey Crain will call us back tomorrow once orders are set so that we may reschedule pts appt.

## 2025-05-16 ENCOUNTER — TELEPHONE (OUTPATIENT)
Age: 45
End: 2025-05-16

## 2025-05-16 NOTE — TELEPHONE ENCOUNTER
Patient has been added to the Medication Management and Talk Therapy wait list without a referral.    Insurance: [place]   Insurance Type:    Commercial []   Medicaid [x]   [facility] (if applicable) [id number] []  Location Preference: [place]  Provider Preference: none  Virtual: Yes [x] No []  Were outside resources sent: Yes [x] No []

## 2025-05-19 ENCOUNTER — TELEPHONE (OUTPATIENT)
Dept: PSYCHIATRY | Facility: CLINIC | Age: 45
End: 2025-05-19

## 2025-05-21 ENCOUNTER — TELEPHONE (OUTPATIENT)
Age: 45
End: 2025-05-21

## 2025-05-21 NOTE — TELEPHONE ENCOUNTER
Patient called inquiring if the referral was sent by her PCP. Writer checked in  and the referral was received and scanned. Pt asked if she can get scheduled and writer informed patient she is on the wait list and we will call her as soon as her turn comes up on the wait list. Writer asked the patient if she is in need of medication, she may contact her PCP and also writer gave her the information to the [facility] in [place]. Pt expressed her understanding. Pt was calm.